# Patient Record
Sex: FEMALE | Race: WHITE | Employment: UNEMPLOYED | URBAN - METROPOLITAN AREA
[De-identification: names, ages, dates, MRNs, and addresses within clinical notes are randomized per-mention and may not be internally consistent; named-entity substitution may affect disease eponyms.]

---

## 2017-02-14 ENCOUNTER — ONCOLOGY VISIT (OUTPATIENT)
Dept: ONCOLOGY | Facility: CLINIC | Age: 62
End: 2017-02-14
Attending: INTERNAL MEDICINE
Payer: COMMERCIAL

## 2017-02-14 VITALS
TEMPERATURE: 98.3 F | DIASTOLIC BLOOD PRESSURE: 85 MMHG | HEART RATE: 102 BPM | WEIGHT: 148.2 LBS | RESPIRATION RATE: 18 BRPM | OXYGEN SATURATION: 97 % | BODY MASS INDEX: 23.92 KG/M2 | SYSTOLIC BLOOD PRESSURE: 143 MMHG

## 2017-02-14 DIAGNOSIS — M89.8X9 BONE PAIN: ICD-10-CM

## 2017-02-14 DIAGNOSIS — D64.9 ANEMIA, UNSPECIFIED TYPE: ICD-10-CM

## 2017-02-14 DIAGNOSIS — R61 NIGHT SWEATS: Primary | ICD-10-CM

## 2017-02-14 DIAGNOSIS — E04.1 THYROID NODULE: ICD-10-CM

## 2017-02-14 DIAGNOSIS — C41.9 EWING'S SARCOMA OF BONE (H): ICD-10-CM

## 2017-02-14 DIAGNOSIS — R79.89 LOW VITAMIN B12 LEVEL: ICD-10-CM

## 2017-02-14 DIAGNOSIS — D75.89 MACROCYTOSIS: ICD-10-CM

## 2017-02-14 DIAGNOSIS — F43.22 ADJUSTMENT DISORDER WITH ANXIOUS MOOD: ICD-10-CM

## 2017-02-14 DIAGNOSIS — R61 NIGHT SWEATS: ICD-10-CM

## 2017-02-14 LAB
ALBUMIN SERPL-MCNC: 3.6 G/DL (ref 3.4–5)
ALBUMIN SERPL-MCNC: 3.8 G/DL (ref 3.4–5)
ALP SERPL-CCNC: 141 U/L (ref 40–150)
ALP SERPL-CCNC: 143 U/L (ref 40–150)
ALT SERPL W P-5'-P-CCNC: 21 U/L (ref 0–50)
ALT SERPL W P-5'-P-CCNC: 23 U/L (ref 0–50)
ANION GAP SERPL CALCULATED.3IONS-SCNC: 10 MMOL/L (ref 3–14)
ANION GAP SERPL CALCULATED.3IONS-SCNC: 7 MMOL/L (ref 3–14)
AST SERPL W P-5'-P-CCNC: 22 U/L (ref 0–45)
AST SERPL W P-5'-P-CCNC: 22 U/L (ref 0–45)
BASOPHILS # BLD AUTO: 0 10E9/L (ref 0–0.2)
BASOPHILS # BLD AUTO: 0 10E9/L (ref 0–0.2)
BASOPHILS NFR BLD AUTO: 0.3 %
BASOPHILS NFR BLD AUTO: 0.4 %
BILIRUB SERPL-MCNC: 0.3 MG/DL (ref 0.2–1.3)
BILIRUB SERPL-MCNC: 0.3 MG/DL (ref 0.2–1.3)
BUN SERPL-MCNC: 16 MG/DL (ref 7–30)
BUN SERPL-MCNC: 17 MG/DL (ref 7–30)
CALCIUM SERPL-MCNC: 9 MG/DL (ref 8.5–10.1)
CALCIUM SERPL-MCNC: 9.4 MG/DL (ref 8.5–10.1)
CHLORIDE SERPL-SCNC: 103 MMOL/L (ref 94–109)
CHLORIDE SERPL-SCNC: 105 MMOL/L (ref 94–109)
CO2 SERPL-SCNC: 26 MMOL/L (ref 20–32)
CO2 SERPL-SCNC: 30 MMOL/L (ref 20–32)
CREAT SERPL-MCNC: 0.77 MG/DL (ref 0.52–1.04)
CREAT SERPL-MCNC: 0.82 MG/DL (ref 0.52–1.04)
DIFFERENTIAL METHOD BLD: ABNORMAL
DIFFERENTIAL METHOD BLD: ABNORMAL
EOSINOPHIL # BLD AUTO: 0.1 10E9/L (ref 0–0.7)
EOSINOPHIL # BLD AUTO: 0.1 10E9/L (ref 0–0.7)
EOSINOPHIL NFR BLD AUTO: 1 %
EOSINOPHIL NFR BLD AUTO: 1.2 %
ERYTHROCYTE [DISTWIDTH] IN BLOOD BY AUTOMATED COUNT: 13.2 % (ref 10–15)
ERYTHROCYTE [DISTWIDTH] IN BLOOD BY AUTOMATED COUNT: 13.4 % (ref 10–15)
ERYTHROCYTE [SEDIMENTATION RATE] IN BLOOD BY WESTERGREN METHOD: 13 MM/H (ref 0–30)
GFR SERPL CREATININE-BSD FRML MDRD: 70 ML/MIN/1.7M2
GFR SERPL CREATININE-BSD FRML MDRD: 76 ML/MIN/1.7M2
GLUCOSE SERPL-MCNC: 102 MG/DL (ref 70–99)
GLUCOSE SERPL-MCNC: 110 MG/DL (ref 70–99)
HCT VFR BLD AUTO: 38.3 % (ref 35–47)
HCT VFR BLD AUTO: 40.8 % (ref 35–47)
HGB BLD-MCNC: 13.1 G/DL (ref 11.7–15.7)
HGB BLD-MCNC: 13.4 G/DL (ref 11.7–15.7)
IMM GRANULOCYTES # BLD: 0 10E9/L (ref 0–0.4)
IMM GRANULOCYTES # BLD: 0 10E9/L (ref 0–0.4)
IMM GRANULOCYTES NFR BLD: 0.3 %
IMM GRANULOCYTES NFR BLD: 0.5 %
LYMPHOCYTES # BLD AUTO: 0.6 10E9/L (ref 0.8–5.3)
LYMPHOCYTES # BLD AUTO: 0.7 10E9/L (ref 0.8–5.3)
LYMPHOCYTES NFR BLD AUTO: 11.5 %
LYMPHOCYTES NFR BLD AUTO: 9 %
MCH RBC QN AUTO: 34.4 PG (ref 26.5–33)
MCH RBC QN AUTO: 34.8 PG (ref 26.5–33)
MCHC RBC AUTO-ENTMCNC: 32.8 G/DL (ref 31.5–36.5)
MCHC RBC AUTO-ENTMCNC: 34.2 G/DL (ref 31.5–36.5)
MCV RBC AUTO: 102 FL (ref 78–100)
MCV RBC AUTO: 105 FL (ref 78–100)
MONOCYTES # BLD AUTO: 0.6 10E9/L (ref 0–1.3)
MONOCYTES # BLD AUTO: 0.6 10E9/L (ref 0–1.3)
MONOCYTES NFR BLD AUTO: 8.1 %
MONOCYTES NFR BLD AUTO: 9.4 %
NEUTROPHILS # BLD AUTO: 4.6 10E9/L (ref 1.6–8.3)
NEUTROPHILS # BLD AUTO: 5.5 10E9/L (ref 1.6–8.3)
NEUTROPHILS NFR BLD AUTO: 77.3 %
NEUTROPHILS NFR BLD AUTO: 81 %
NRBC # BLD AUTO: 0 10*3/UL
NRBC # BLD AUTO: 0 10*3/UL
NRBC BLD AUTO-RTO: 0 /100
NRBC BLD AUTO-RTO: 0 /100
PLATELET # BLD AUTO: 360 10E9/L (ref 150–450)
PLATELET # BLD AUTO: 407 10E9/L (ref 150–450)
POTASSIUM SERPL-SCNC: 4.2 MMOL/L (ref 3.4–5.3)
POTASSIUM SERPL-SCNC: 4.2 MMOL/L (ref 3.4–5.3)
PROT SERPL-MCNC: 6.8 G/DL (ref 6.8–8.8)
PROT SERPL-MCNC: 7 G/DL (ref 6.8–8.8)
RBC # BLD AUTO: 3.76 10E12/L (ref 3.8–5.2)
RBC # BLD AUTO: 3.89 10E12/L (ref 3.8–5.2)
SODIUM SERPL-SCNC: 140 MMOL/L (ref 133–144)
SODIUM SERPL-SCNC: 141 MMOL/L (ref 133–144)
WBC # BLD AUTO: 6 10E9/L (ref 4–11)
WBC # BLD AUTO: 6.8 10E9/L (ref 4–11)

## 2017-02-14 PROCEDURE — 85025 COMPLETE CBC W/AUTO DIFF WBC: CPT | Performed by: INTERNAL MEDICINE

## 2017-02-14 PROCEDURE — 80053 COMPREHEN METABOLIC PANEL: CPT | Performed by: INTERNAL MEDICINE

## 2017-02-14 PROCEDURE — 99212 OFFICE O/P EST SF 10 MIN: CPT

## 2017-02-14 PROCEDURE — 85652 RBC SED RATE AUTOMATED: CPT | Performed by: INTERNAL MEDICINE

## 2017-02-14 PROCEDURE — 36415 COLL VENOUS BLD VENIPUNCTURE: CPT

## 2017-02-14 PROCEDURE — 99214 OFFICE O/P EST MOD 30 MIN: CPT | Mod: 25 | Performed by: INTERNAL MEDICINE

## 2017-02-14 ASSESSMENT — PAIN SCALES - GENERAL: PAINLEVEL: NO PAIN (0)

## 2017-02-14 NOTE — NURSING NOTE
"Lilibeth Pena is a 61 year old female who presents for:  Chief Complaint   Patient presents with     Blood Draw     labs drawn from left arm and vitals taken by [facility]      Oncology Clinic Visit     Return for [place]coma , CT results        Initial Vitals:  /85 (BP Location: Left arm, Patient Position: Chair, Cuff Size: Adult Regular)  Pulse 102  Temp 98.3  F (36.8  C) (Oral)  Resp 18  Wt 67.2 kg (148 lb 3.2 oz)  LMP 05/08/1997  SpO2 97%  BMI 23.92 kg/m2 Estimated body mass index is 23.92 kg/(m^2) as calculated from the following:    Height as of 9/14/15: 1.676 m (5' 6\").    Weight as of this encounter: 67.2 kg (148 lb 3.2 oz).. Body surface area is 1.77 meters squared. BP completed using cuff size: NA (Not Taken)  No Pain (0) Patient's last menstrual period was 05/08/1997. Allergies and medications reviewed.     Medications: Medication refills not needed today.  Pharmacy name entered into [facility]:    [facility] - [address] [facility] [address]    Comments:     5  minutes for nursing intake (face to face time)   Esther Rodriguez MA          "

## 2017-02-14 NOTE — PROGRESS NOTES
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   -  Background  In brief, she noted some right knee pain felt secondary to DJD for a number of years. In about 05/2013, she began noticing a bit of a lump and tenderness a bit farther down the right shin. This was gradually increasing and getting worse over a 1-month time frame. She had some longstanding tingling between the right first two toes. 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  FISH was negative for FLI1/EWSR1 on the BMBX.  -  She started CAV about 10-30-13.  She had 5 cycles of CAV, and 5 cycles of IMV.  Surgery was 5-15-14 w negative margins and a microscopic focus of residual tumor.  She had a nodule removed from the left calf that turned out to be a scar.     Folate and B12 were normal here in 2014.  -  She has had a B12 test that was clearly low at 109 locally and is now on B12 injections.     B12 in 8/15 was 112,  MMA was 0.36 (nl <0.40).  She thinks she was taking B12 before starting in 2013 a bit before starting chemo and wound up stopping during chemo.  She does not have a B12 level here today from the time she started B12.  Therefore its a bit complicated\, but it was low again so she will continue B12 monthly w her PCP.  -     Interval history     She is doing pretty well overall.    She still has her knee pain but it is usually OK with limited use of tramadol and gabapentin; she sees Dr Valdes today on that.    She is working 4 days a week at a local [email or web address].    She is taking zantac daily and occ pepto-bismol, and notices some nausea prior to eating for which she takes zofran, but not often.       She remains on B12 for documented low B12.    She has noted migratory aches and some more night sweats taht she thinks is not related to hot flashes.    She has numbness in the foot post op no different.     Mood is good usually and is  on zoloft, but she is more anxious with her aches and sweats.        She also has a history of kidney stones with a number of hospitalizations. None recently.  She has a history of lumbar low back pain with disk disease and in the past was treated for blood pressure and cholersterol now.       10-point ROS o/w negative.        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   Family history is notable for carcinoid tumor in mother and breast cancer in an aunt and a cousin.   She is  and has one daughter and also a son from her current marriage. She stopped smoking in 1982 but never smoked very much. She drinks about 3 glasses of wine a day and does not abuse other drugs.   -    On exam she appeared comfortable with normal affect.     /85 (BP Location: Left arm, Patient Position: Chair, Cuff Size: Adult Regular)  Pulse 102  Temp 98.3  F (36.8  C) (Oral)  Resp 18  Wt 67.2 kg (148 lb 3.2 oz)  LMP 05/08/1997  SpO2 97%  BMI 23.92 kg/m2  HEENT No icterus, normal oropharynx.  NECK no mass.  CHEST Clear chest.  CV RRR w no sig murmur  ABD No HSMT.  LYMPH No lymphadenopathy.  EXT No edema.  NEURO Heel and toe walking OK, gait normal    Local sites OK on legs.  PSYCH Mood somewhat anxious.     -  No labs yet today.  -  Her original PET-CT did not show metastatic disease.   I reviewed her new CT and I see no new lesions, and that is the formal read.  -     She is now >2.5 years out from the EWS resection (May 2014).     We will follow the MCV for now; she is on B12 now.  The MCV increased toward the end of chemotherapy and has been up since then.    She continues on as needed tramadol.  Zantac will continue     She will follow w Dr Castro on thyroid nodules next week.    The sweats and aches are very non-specific but we will get an ESR today and if abnormal refer to rheum.    We will se her about  6-15 w cxr and labs, and also about 10-12. She following w her PCP to check cholesterol and BP.  We will use cxr mostly now.  -  Aldo Russ M.D.  Professor  Hematology, Oncology and Transplantation  -  cc:   Bon Mansfield MD, Orthopedics   [place]

## 2017-02-14 NOTE — NURSING NOTE
Chief Complaint   Patient presents with     Blood Draw     labs drawn from left arm and vitals taken by JOYA Ma CMA February 14, 2017

## 2017-02-14 NOTE — LETTER
2/14/2017       RE: Lilibeth Pena  [address] [place] [address]     Dear Colleague,    Thank you for referring your patient, Lilibeth Pena, to the [facility]. Please see a copy of my visit note below.    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   -  Background  In brief, she noted some right knee pain felt secondary to DJD for a number of years. In about 05/2013, she began noticing a bit of a lump and tenderness a bit farther down the right shin. This was gradually increasing and getting worse over a 1-month time frame. She had some longstanding tingling between the right first two toes. 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  FISH was negative for FLI1/EWSR1 on the BMBX.  -  She started CAV about 10-30-13.  She had 5 cycles of CAV, and 5 cycles of IMV.  Surgery was 5-15-14 w negative margins and a microscopic focus of residual tumor.  She had a nodule removed from the left calf that turned out to be a scar.     Folate and B12 were normal here in 2014.  -  She has had a B12 test that was clearly low at 109 locally and is now on B12 injections.     B12 in 8/15 was 112,  MMA was 0.36 (nl <0.40).  She thinks she was taking B12 before starting in 2013 a bit before starting chemo and wound up stopping during chemo.  She does not have a B12 level here today from the time she started B12.  Therefore its a bit complicated\, but it was low again so she will continue B12 monthly w her PCP.  -     Interval history     She is doing pretty well overall.    She still has her knee pain but it is usually OK with limited use of tramadol and gabapentin; she sees Dr Valdes today on that.    She is working 4 days a week at a local [employer].    She is taking zantac daily and occ pepto-bismol, and notices some nausea prior to eating for which she takes  zofran, but not often.       She remains on B12 for documented low B12.    She has noted migratory aches and some more night sweats taht she thinks is not related to hot flashes.    She has numbness in the foot post op no different.     Mood is good usually and is on zoloft, but she is more anxious with her aches and sweats.        She also has a history of kidney stones with a number of hospitalizations. None recently.  She has a history of lumbar low back pain with disk disease and in the past was treated for blood pressure and cholersterol now.       10-point ROS o/w negative.        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   Family history is notable for carcinoid tumor in mother and breast cancer in an aunt and a cousin.   She is  and has one daughter and also a son from her current marriage. She stopped smoking in 1982 but never smoked very much. She drinks about 3 glasses of wine a day and does not abuse other drugs.   -    On exam she appeared comfortable with normal affect.     /85 (BP Location: Left arm, Patient Position: Chair, Cuff Size: Adult Regular)  Pulse 102  Temp 98.3  F (36.8  C) (Oral)  Resp 18  Wt 67.2 kg (148 lb 3.2 oz)  LMP 05/08/1997  SpO2 97%  BMI 23.92 kg/m2  HEENT No icterus, normal oropharynx.  NECK no mass.  CHEST Clear chest.  CV RRR w no sig murmur  ABD No HSMT.  LYMPH No lymphadenopathy.  EXT No edema.  NEURO Heel and toe walking OK, gait normal    Local sites OK on legs.  PSYCH Mood somewhat anxious.     -  No labs yet today.  -  Her original PET-CT did not show metastatic disease.   I reviewed her new CT and I see no new lesions, and that is the formal read.  -     She is now >2.5 years out from the EWS resection (May 2014).     We will follow the MCV for now; she is on B12 now.  The MCV increased toward the end of chemotherapy and has been up since  then.    She continues on as needed tramadol.  Zantac will continue     She will follow w Dr Castro on thyroid nodules next week.    The sweats and aches are very non-specific but we will get an ESR today and if abnormal refer to rheum.    We will se her about 6-15 w cxr and labs, and also about 10-12. She following w her PCP to check cholesterol and BP.  We will use cxr mostly now.  -  Aldo Russ M.D.  Professor  Hematology, Oncology and Transplantation  -  cc:   Bon Mansfield MD, Orthopedics   [place]

## 2017-02-14 NOTE — NURSING NOTE
Chief Complaint   Patient presents with     Blood Draw     Labs drawn peripherally from left arm    Ann Suero LPN

## 2017-02-14 NOTE — MR AVS SNAPSHOT
After Visit Summary   2/14/2017    Lilibeth Pena    MRN: [id number]           Patient Information     Date Of Birth          1955        Visit Information        Provider Department      2/14/2017 11:00 AM Aldo Russ MD [facility]        Today's Diagnoses     Night sweats    -  1    Anemia, unspecified type        Adjustment disorder with anxious mood        Crowder's sarcoma of bone (H)        Thyroid nodule        Low vitamin B12 level        Macrocytosis        Bone pain           Follow-ups after your visit        Your next 10 appointments already scheduled     Feb 21, 2017  4:30 PM CST   (Arrive by 4:00 PM)   RETURN ENDOCRINE with Ashely Castro MD   [facility] ([facility])    [address] [place] [address]  3rd [facility] [address] [phone]            Will 15, 2017  1:00 PM CDT   (Arrive by 12:45 PM)   Return Visit with Aldo Russ MD   [facility] ([facility])    [address] [place] [address] [phone]              Future tests that were ordered for you today     Open Future Orders        Priority Expected Expires Ordered    CBC with platelets differential Routine 6/15/2017 2/14/2018 2/14/2017    Comprehensive metabolic panel Routine 6/15/2017 2/14/2018 2/14/2017    XR Chest 2 Views Routine 6/15/2017 2/14/2018 2/14/2017    Erythrocyte sedimentation rate auto Routine  2/14/2018 2/14/2017            Who to contact     If you have questions or need follow up information about today's clinic visit or your schedule please contact [facility] directly at [phone].  Normal or non-critical lab and imaging results will be communicated to you by MyChart, letter or phone within 4 business days after the clinic has received the results. If you do not hear from us within 7 days, please contact the clinic  through Becual or phone. If you have a critical or abnormal lab result, we will notify you by phone as soon as possible.  Submit refill requests through Becual or call your pharmacy and they will forward the refill request to us. Please allow 3 business days for your refill to be completed.          Additional Information About Your Visit        Morningstarhart Information     Becual gives you secure access to your electronic health record. If you see a primary care provider, you can also send messages to your care team and make appointments. If you have questions, please call your primary care clinic.  If you do not have a primary care provider, please call [phone] and they will assist you.        Care EveryWhere ID     This is your Care EveryWhere ID. This could be used by other organizations to access your [place] medical records  [id number]        Your Vitals Were     Pulse Temperature Respirations Last Period Pulse Oximetry BMI (Body Mass Index)    102 98.3  F (36.8  C) (Oral) 18 05/08/1997 97% 23.92 kg/m2       Blood Pressure from Last 3 Encounters:   02/14/17 143/85   10/19/16 129/85   06/15/16 147/90    Weight from Last 3 Encounters:   02/14/17 67.2 kg (148 lb 3.2 oz)   10/19/16 65.4 kg (144 lb 2.9 oz)   06/15/16 66.5 kg (146 lb 8 oz)               Primary Care Provider Office Phone # Fax #    Nayan Batres [phone]       [facility] [address] [place] [address]        Thank you!     Thank you for choosing [facility]  for your care. Our goal is always to provide you with excellent care. Hearing back from our patients is one way we can continue to improve our services. Please take a few minutes to complete the written survey that you may receive in the mail after your visit with us. Thank you!             Your Updated Medication List - Protect others around you: Learn how to safely use, store and throw away your medicines at www.disposemymeds.org.           This list is accurate as of: 2/14/17 11:32 AM.  Always use your most recent med list.                   Brand Name Dispense Instructions for use    gabapentin 300 MG capsule    NEURONTIN    90 capsule    Take 3 tablets (900mg) before bed       HYDROcodone-acetaminophen 5-325 MG per tablet    NORCO    40 tablet    Take 1-2 tablets by mouth every 6 hours as needed for moderate to severe pain       ranitidine 150 MG tablet    ZANTAC    60 tablet    Take 1 tablet (150 mg) by mouth daily       rOPINIRole HCl 2 MG Tb24 tablet    REQUIP     Take 0.5 mg by mouth nightly as needed       SERTRALINE HCL PO      Take 50 mg by mouth daily       traMADol 50 MG tablet    ULTRAM    60 tablet    Take 1-2 tablets ( mg) by mouth every 6 hours as needed for moderate pain       TRAZODONE HCL PO      Take 50 mg by mouth At Bedtime       VITAMIN D (CHOLECALCIFEROL) PO      Take 5,000 Units by mouth daily

## 2017-06-15 ENCOUNTER — ONCOLOGY VISIT (OUTPATIENT)
Dept: ONCOLOGY | Facility: CLINIC | Age: 62
End: 2017-06-15
Attending: INTERNAL MEDICINE
Payer: COMMERCIAL

## 2017-06-15 VITALS
OXYGEN SATURATION: 95 % | RESPIRATION RATE: 18 BRPM | SYSTOLIC BLOOD PRESSURE: 126 MMHG | BODY MASS INDEX: 24.19 KG/M2 | DIASTOLIC BLOOD PRESSURE: 83 MMHG | WEIGHT: 149.9 LBS | HEART RATE: 85 BPM | TEMPERATURE: 98.5 F

## 2017-06-15 DIAGNOSIS — D75.89 MACROCYTOSIS: ICD-10-CM

## 2017-06-15 DIAGNOSIS — C41.9 EWING'S SARCOMA OF BONE (H): ICD-10-CM

## 2017-06-15 DIAGNOSIS — R79.89 LOW VITAMIN B12 LEVEL: ICD-10-CM

## 2017-06-15 DIAGNOSIS — E04.1 THYROID NODULE: ICD-10-CM

## 2017-06-15 DIAGNOSIS — D64.9 ANEMIA, UNSPECIFIED TYPE: ICD-10-CM

## 2017-06-15 DIAGNOSIS — F43.22 ADJUSTMENT DISORDER WITH ANXIOUS MOOD: ICD-10-CM

## 2017-06-15 PROCEDURE — 80053 COMPREHEN METABOLIC PANEL: CPT | Performed by: INTERNAL MEDICINE

## 2017-06-15 PROCEDURE — 99212 OFFICE O/P EST SF 10 MIN: CPT | Mod: ZF

## 2017-06-15 PROCEDURE — 36415 COLL VENOUS BLD VENIPUNCTURE: CPT | Performed by: INTERNAL MEDICINE

## 2017-06-15 PROCEDURE — 99214 OFFICE O/P EST MOD 30 MIN: CPT | Mod: 25 | Performed by: INTERNAL MEDICINE

## 2017-06-15 ASSESSMENT — PAIN SCALES - GENERAL: PAINLEVEL: NO PAIN (0)

## 2017-06-15 NOTE — PROGRESS NOTES
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   -  Background  In brief, she noted some right knee pain felt secondary to DJD for a number of years. In about 05/2013, she began noticing a bit of a lump and tenderness a bit farther down the right shin. This was gradually increasing and getting worse over a 1-month time frame. She had some longstanding tingling between the right first two toes. 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  FISH was negative for FLI1/EWSR1 on the BMBX.  -  She started CAV about 10-30-13.  She had 5 cycles of CAV, and 5 cycles of IMV.  Surgery was 5-15-14 w negative margins and a microscopic focus of residual tumor.  She had a nodule removed from the left calf that turned out to be a scar.      Folate and B12 were normal here in 2014.  -  She has had a B12 test that was clearly low at 109 locally and is now on B12 injections.     B12 in 8/15 was 112,  MMA was 0.36 (nl <0.40).  She thinks she was taking B12 before starting in 2013 a bit before starting chemo and wound up stopping during chemo.  She does not have a B12 level here today from the time she started B12.  Therefore its a bit complicated\, but it was low again so she will continue B12 monthly w her PCP.  -      Interval history      She is doing pretty well overall.    She still has her knee pain but it is usually OK with limited use of tramadol and gabapentin; she saw Dr Valdes who felt it was too early for surgery.    She is working 4 days a week at a local [employer].     She is taking zantac daily w no pepto-bismol, only takes zofran.  She has a long history of gi problems w occ diarrhea depending the food.      She is no longer on B12 but this is being followed by her PCP.    She has numbness in the foot post op no different.    Mood is good usually and is on zoloft 100 mg (which was better than 50).      She  had 2 more kidney stones since her last visit here.    She has a history of lumbar low back pain with disk disease and in the past was treated for blood pressure and cholersterol now.       10-point ROS o/w negative.     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   Family history is notable for carcinoid tumor in mother and breast cancer in an aunt and a cousin.   She is  and has one daughter and also a son from her current marriage. She stopped smoking in 1982 but never smoked very much. She drinks about 3 glasses of wine a day and does not abuse other drugs.   -     On exam she appeared comfortable with normal affect.  /83 (BP Location: Left arm)  Pulse 85  Temp 98.5  F (36.9  C)  Resp 18  Wt 68 kg (149 lb 14.4 oz)  LMP 05/08/1997  SpO2 95%  BMI 24.19 kg/m2  HEENT No icterus, normal oropharynx.  NECK no mass.  CHEST Clear chest.  CV RRR w no sig murmur  ABD No HSMT.  EXT No edema.  NEURO Heel and toe walking OK, gait normal.    LOCAL SITE Local sites OK on legs.  PSYCH Mood somewhat anxious.      -  No labs yet today.  -  Her original PET-CT did not show metastatic disease.   I reviewed her new cxr and I see no new lesions, and that is the formal read.    -      She is now 3 years out from the EWS resection (May 2014).      We will follow the MCV for now; she is on B12 now.  The MCV increased toward the end of chemotherapy and has been up since then.    She continues on as needed tramadol.  Zantac will continue  She will follow w Dr Castro on thyroid nodules.    We will se her about 10-12 w cxr and labs (CBC, CMP re MCV). She following w her PCP to check cholesterol and BP.  We will use cxr mostly now.  -  Aldo Russ M.D.  Professor  Hematology, Oncology and Transplantation  -  cc:   Bon Mansfield MD, Orthopedics

## 2017-06-15 NOTE — MR AVS SNAPSHOT
After Visit Summary   6/15/2017    Lilibeth Pena    MRN: [id number]           Patient Information     Date Of Birth          1955        Visit Information        Provider Department      6/15/2017 1:00 PM Aldo Russ MD [facility]        Today's Diagnoses     Anemia, unspecified type        Adjustment disorder with anxious mood        Crowder's sarcoma of bone (H)        Thyroid nodule        Low vitamin B12 level        Macrocytosis           Follow-ups after your visit        Your next 10 appointments already scheduled     Oct 12, 2017  1:00 PM CDT   (Arrive by 12:45 PM)   Return Visit with Aldo Russ MD   [facility] ([facility])    [address]  [address] [facility] [address] [phone]              Future tests that were ordered for you today     Open Future Orders        Priority Expected Expires Ordered    XR Chest 2 Views Routine 10/12/2017 6/15/2018 6/15/2017            Who to contact     If you have questions or need follow up information about today's clinic visit or your schedule please contact [facility] directly at [phone].  Normal or non-critical lab and imaging results will be communicated to you by MyChart, letter or phone within 4 business days after the clinic has received the results. If you do not hear from us within 7 days, please contact the clinic through ProNervehart or phone. If you have a critical or abnormal lab result, we will notify you by phone as soon as possible.  Submit refill requests through ObserveIT or call your pharmacy and they will forward the refill request to us. Please allow 3 business days for your refill to be completed.          Additional Information About Your Visit        ProNervehart Information     ObserveIT gives you secure access to your electronic health record. If you see a primary care provider, you can also send  messages to your care team and make appointments. If you have questions, please call your primary care clinic.  If you do not have a primary care provider, please call [phone] and they will assist you.        Care EveryWhere ID     This is your Care EveryWhere ID. This could be used by other organizations to access your [place] medical records  [id number]        Your Vitals Were     Pulse Temperature Respirations Last Period Pulse Oximetry BMI (Body Mass Index)    85 98.5  F (36.9  C) 18 05/08/1997 95% 24.19 kg/m2       Blood Pressure from Last 3 Encounters:   06/15/17 126/83   02/14/17 143/85   10/19/16 129/85    Weight from Last 3 Encounters:   06/15/17 68 kg (149 lb 14.4 oz)   02/14/17 67.2 kg (148 lb 3.2 oz)   10/19/16 65.4 kg (144 lb 2.9 oz)              We Performed the Following     CBC with platelets differential     Comprehensive metabolic panel        Primary Care Provider Office Phone # Fax #    Nayan Batres [phone]       [facility] [address] [place] [address]        Thank you!     Thank you for choosing [facility]  for your care. Our goal is always to provide you with excellent care. Hearing back from our patients is one way we can continue to improve our services. Please take a few minutes to complete the written survey that you may receive in the mail after your visit with us. Thank you!             Your Updated Medication List - Protect others around you: Learn how to safely use, store and throw away your medicines at www.disposemymeds.org.          This list is accurate as of: 6/15/17  1:11 PM.  Always use your most recent med list.                   Brand Name Dispense Instructions for use    gabapentin 300 MG capsule    NEURONTIN    90 capsule    Take 3 tablets (900mg) before bed       HYDROcodone-acetaminophen 5-325 MG per tablet    NORCO    40 tablet    Take 1-2 tablets by mouth every 6 hours as needed for moderate to severe pain        ranitidine 150 MG tablet    ZANTAC    60 tablet    Take 1 tablet (150 mg) by mouth daily       rOPINIRole HCl 2 MG Tb24 tablet    REQUIP     Take 0.5 mg by mouth nightly as needed       SERTRALINE HCL PO      Take 50 mg by mouth daily       traMADol 50 MG tablet    ULTRAM    60 tablet    Take 1-2 tablets ( mg) by mouth every 6 hours as needed for moderate pain       TRAZODONE HCL PO      Take 50 mg by mouth At Bedtime       VITAMIN D (CHOLECALCIFEROL) PO      Take 5,000 Units by mouth daily

## 2017-06-15 NOTE — LETTER
6/15/2017       RE: Lilibeth Pena  [address] [facility] [address] [address]     Dear Colleague,    Thank you for referring your patient, Lilibeth Pena, to the [facility]. Please see a copy of my visit note below.    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   -  Background  In brief, she noted some right knee pain felt secondary to DJD for a number of years. In about 05/2013, she began noticing a bit of a lump and tenderness a bit farther down the right shin. This was gradually increasing and getting worse over a 1-month time frame. She had some longstanding tingling between the right first two toes. 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  FISH was negative for FLI1/EWSR1 on the BMBX.  -  She started CAV about 10-30-13.  She had 5 cycles of CAV, and 5 cycles of IMV.  Surgery was 5-15-14 w negative margins and a microscopic focus of residual tumor.  She had a nodule removed from the left calf that turned out to be a scar.      Folate and B12 were normal here in 2014.  -  She has had a B12 test that was clearly low at 109 locally and is now on B12 injections.     B12 in 8/15 was 112,  MMA was 0.36 (nl <0.40).  She thinks she was taking B12 before starting in 2013 a bit before starting chemo and wound up stopping during chemo.  She does not have a B12 level here today from the time she started B12.  Therefore its a bit complicated\, but it was low again so she will continue B12 monthly w her PCP.  -      Interval history      She is doing pretty well overall.    She still has her knee pain but it is usually OK with limited use of tramadol and gabapentin; she saw Dr Valdes who felt it was too early for surgery.    She is working 4 days a week at a local [employer].     She is taking zantac daily w no pepto-bismol, only takes zofran.  She has a long history  of gi problems w occ diarrhea depending the food.      She is no longer on B12 but this is being followed by her PCP.    She has numbness in the foot post op no different.    Mood is good usually and is on zoloft 100 mg (which was better than 50).      She had 2 more kidney stones since her last visit here.    She has a history of lumbar low back pain with disk disease and in the past was treated for blood pressure and cholersterol now.       10-point ROS o/w negative.     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   Family history is notable for carcinoid tumor in mother and breast cancer in an aunt and a cousin.   She is  and has one daughter and also a son from her current marriage. She stopped smoking in 1982 but never smoked very much. She drinks about 3 glasses of wine a day and does not abuse other drugs.   -     On exam she appeared comfortable with normal affect.  /83 (BP Location: Left arm)  Pulse 85  Temp 98.5  F (36.9  C)  Resp 18  Wt 68 kg (149 lb 14.4 oz)  LMP 05/08/1997  SpO2 95%  BMI 24.19 kg/m2  HEENT No icterus, normal oropharynx.  NECK no mass.  CHEST Clear chest.  CV RRR w no sig murmur  ABD No HSMT.  EXT No edema.  NEURO Heel and toe walking OK, gait normal.    LOCAL SITE Local sites OK on legs.  PSYCH Mood somewhat anxious.      -  No labs yet today.  -  Her original PET-CT did not show metastatic disease.   I reviewed her new cxr and I see no new lesions, and that is the formal read.    -      She is now 3 years out from the EWS resection (May 2014).      We will follow the MCV for now; she is on B12 now.  The MCV increased toward the end of chemotherapy and has been up since then.    She continues on as needed tramadol.  Zantac will continue  She will follow w Dr Castro on thyroid nodules.    We will se her about 10-12 w cxr and labs (CBC, CMP re  MCV). She following w her PCP to check cholesterol and BP.  We will use cxr mostly now.  -  Aldo Russ M.D.  Professor  Hematology, Oncology and Transplantation  -  cc:   Bon Mansfield MD, Orthopedics

## 2017-06-15 NOTE — NURSING NOTE
"Oncology Rooming Note    Nely 15, 2017 12:54 PM   Lilibeth Pena is a 62 year old female who presents for:    Chief Complaint   Patient presents with     Oncology Clinic Visit     Crowder's Sarcoma     Initial Vitals: /83 (BP Location: Left arm)  Pulse 85  Temp 98.5  F (36.9  C)  Resp 18  Wt 68 kg (149 lb 14.4 oz)  LMP 05/08/1997  SpO2 95%  BMI 24.19 kg/m2 Estimated body mass index is 24.19 kg/(m^2) as calculated from the following:    Height as of 9/14/15: 1.676 m (5' 6\").    Weight as of this encounter: 68 kg (149 lb 14.4 oz). Body surface area is 1.78 meters squared.  No Pain (0) Comment: Data Unavailable   Patient's last menstrual period was 05/08/1997.  Allergies reviewed: Yes  Medications reviewed: Yes    Medications: Medication refills not needed today.  Pharmacy name entered into [facility]:    [facility] - [address] [facility] [address], [place] - [address]    Clinical concerns: NA     8 minutes for nursing intake (face to face time)     Linda Valencia CMA              "

## 2017-10-17 ENCOUNTER — ONCOLOGY VISIT (OUTPATIENT)
Dept: ONCOLOGY | Facility: CLINIC | Age: 62
End: 2017-10-17
Attending: INTERNAL MEDICINE
Payer: COMMERCIAL

## 2017-10-17 VITALS
SYSTOLIC BLOOD PRESSURE: 147 MMHG | HEART RATE: 77 BPM | WEIGHT: 146.61 LBS | DIASTOLIC BLOOD PRESSURE: 85 MMHG | RESPIRATION RATE: 16 BRPM | TEMPERATURE: 98.2 F | OXYGEN SATURATION: 99 % | BODY MASS INDEX: 23.66 KG/M2

## 2017-10-17 DIAGNOSIS — E04.1 THYROID NODULE: ICD-10-CM

## 2017-10-17 DIAGNOSIS — R79.89 LOW VITAMIN B12 LEVEL: ICD-10-CM

## 2017-10-17 DIAGNOSIS — D75.89 MACROCYTOSIS: ICD-10-CM

## 2017-10-17 DIAGNOSIS — C41.9 EWING'S SARCOMA OF BONE (H): ICD-10-CM

## 2017-10-17 DIAGNOSIS — M25.552 HIP PAIN, LEFT: Primary | ICD-10-CM

## 2017-10-17 DIAGNOSIS — F43.22 ADJUSTMENT DISORDER WITH ANXIOUS MOOD: ICD-10-CM

## 2017-10-17 LAB
BASOPHILS # BLD AUTO: 0 10E9/L (ref 0–0.2)
BASOPHILS NFR BLD AUTO: 0.8 %
DIFFERENTIAL METHOD BLD: ABNORMAL
EOSINOPHIL # BLD AUTO: 0.1 10E9/L (ref 0–0.7)
EOSINOPHIL NFR BLD AUTO: 2.4 %
ERYTHROCYTE [DISTWIDTH] IN BLOOD BY AUTOMATED COUNT: 13.7 % (ref 10–15)
HCT VFR BLD AUTO: 41.3 % (ref 35–47)
HGB BLD-MCNC: 13.2 G/DL (ref 11.7–15.7)
IMM GRANULOCYTES # BLD: 0 10E9/L (ref 0–0.4)
IMM GRANULOCYTES NFR BLD: 0.3 %
LYMPHOCYTES # BLD AUTO: 0.6 10E9/L (ref 0.8–5.3)
LYMPHOCYTES NFR BLD AUTO: 15.8 %
MCH RBC QN AUTO: 33 PG (ref 26.5–33)
MCHC RBC AUTO-ENTMCNC: 32 G/DL (ref 31.5–36.5)
MCV RBC AUTO: 103 FL (ref 78–100)
MONOCYTES # BLD AUTO: 0.4 10E9/L (ref 0–1.3)
MONOCYTES NFR BLD AUTO: 10.6 %
NEUTROPHILS # BLD AUTO: 2.7 10E9/L (ref 1.6–8.3)
NEUTROPHILS NFR BLD AUTO: 70.1 %
NRBC # BLD AUTO: 0 10*3/UL
NRBC BLD AUTO-RTO: 0 /100
PLATELET # BLD AUTO: 302 10E9/L (ref 150–450)
RBC # BLD AUTO: 4 10E12/L (ref 3.8–5.2)
WBC # BLD AUTO: 3.8 10E9/L (ref 4–11)

## 2017-10-17 PROCEDURE — 36415 COLL VENOUS BLD VENIPUNCTURE: CPT | Performed by: INTERNAL MEDICINE

## 2017-10-17 PROCEDURE — 85025 COMPLETE CBC W/AUTO DIFF WBC: CPT | Performed by: INTERNAL MEDICINE

## 2017-10-17 PROCEDURE — 99212 OFFICE O/P EST SF 10 MIN: CPT | Mod: ZF

## 2017-10-17 PROCEDURE — 99214 OFFICE O/P EST MOD 30 MIN: CPT | Mod: GC | Performed by: INTERNAL MEDICINE

## 2017-10-17 ASSESSMENT — PAIN SCALES - GENERAL: PAINLEVEL: NO PAIN (0)

## 2017-10-17 NOTE — NURSING NOTE
"Oncology Rooming Note    October 17, 2017 8:09 AM   Lilibeth Pena is a 62 year old female who presents for:    Chief Complaint   Patient presents with     Oncology Clinic Visit     Return for Ewings Sarcoma, CXR results      Initial Vitals: /85  Pulse 77  Temp 98.2  F (36.8  C) (Oral)  Resp 16  Wt 66.5 kg (146 lb 9.7 oz)  LMP 05/08/1997  SpO2 99%  BMI 23.66 kg/m2 Estimated body mass index is 23.66 kg/(m^2) as calculated from the following:    Height as of 9/14/15: 1.676 m (5' 6\").    Weight as of this encounter: 66.5 kg (146 lb 9.7 oz). Body surface area is 1.76 meters squared.  No Pain (0) Comment: Data Unavailable   Patient's last menstrual period was 05/08/1997.  Allergies reviewed: Yes  Medications reviewed: Yes    Medications: Medication refills not needed today.  Pharmacy name entered into [facility]:    [facility] - [address] [facility]    Clinical concerns: results  Petrona was notified.    7 minutes for nursing intake (face to face time)     Esther Rodriguez MA              "

## 2017-10-17 NOTE — MR AVS SNAPSHOT
After Visit Summary   10/17/2017    Lilibeth Pena    MRN: [id number]           Patient Information     Date Of Birth          1955        Visit Information        Provider Department      10/17/2017 8:30 AM Aldo Russ MD [facility]        Today's Diagnoses     Hip pain, left    -  1    Adjustment disorder with anxious mood        Crowder's sarcoma of bone (H)        Thyroid nodule        Low vitamin B12 level        Macrocytosis           Follow-ups after your visit        Additional Services     ORTHOPEDICS ADULT REFERRAL       Your pro                  Your next 10 appointments already scheduled     Oct 27, 2017  2:30 PM CDT   (Arrive by 2:15 PM)   Return Visit with Bon Mansfield MD   [facility] ([facility])    [address] [place] [address]  4th [facility] [id number] [phone]            Feb 15, 2018 11:00 AM CST   (Arrive by 10:45 AM)   XR CHEST 2 VIEWS with UCXR1   [facility] ([facility])    [address] [place] [id number] [phone]           Please bring a list of your current medicines to your exam. (Include vitamins, minerals and over-thecounter medicines.) Leave your valuables at home.  Tell your doctor if there is a chance you may be pregnant.  You do not need to do anything special for this exam.            Feb 15, 2018 11:15 AM CST   LAB with  LAB   [facility] ([facility])    [address] [place] [id number] [phone]           Patient must bring picture ID. Patient should be prepared to give a urine specimen  Please do not eat 10-12 hours before your appointment if you are coming in fasting for labs on lipids, cholesterol, or glucose (sugar). Pregnant women should follow their Care Team instructions. Water with medications is okay. Do not drink coffee or other  fluids. If you have concerns about taking  your medications, please ask at office or if scheduling via vitaMedMD, send a message by clicking on Secure Messaging, Message Your Care Team.            Feb 15, 2018  1:00 PM CST   (Arrive by 12:45 PM)   Return Visit with Aldo Russ MD   [facility] ([facility])    [address] [facility]  2nd [facility] [id number] [phone]            Jun 14, 2018 10:30 AM CDT   (Arrive by 10:15 AM)   Return Visit with Aldo Russ MD   [facility] ([facility])    [address] [place] [id number] [phone]              Future tests that were ordered for you today     Open Future Orders        Priority Expected Expires Ordered    Vitamin B12 Routine 2/15/2018 10/17/2018 10/17/2017    CBC with platelets differential Routine 2/15/2018 9/17/2018 10/17/2017    Comprehensive metabolic panel Routine 2/15/2018 9/17/2018 10/17/2017    XR Chest 2 Views Routine 2/15/2018 9/17/2018 10/17/2017            Who to contact     If you have questions or need follow up information about today's clinic visit or your schedule please contact [facility] directly at [phone].  Normal or non-critical lab and imaging results will be communicated to you by Futuretechart, letter or phone within 4 business days after the clinic has received the results. If you do not hear from us within 7 days, please contact the clinic through Futuretechart or phone. If you have a critical or abnormal lab result, we will notify you by phone as soon as possible.  Submit refill requests through vitaMedMD or call your pharmacy and they will forward the refill request to us. Please allow 3 business days for your refill to be completed.          Additional Information About Your Visit        Futuretecharticckle Information     vitaMedMD gives you secure access to your electronic health  record. If you see a primary care provider, you can also send messages to your care team and make appointments. If you have questions, please call your primary care clinic.  If you do not have a primary care provider, please call [phone] and they will assist you.        Care EveryWhere ID     This is your Care EveryWhere ID. This could be used by other organizations to access your [facility] medical records  [id number]        Your Vitals Were     Pulse Temperature Respirations Last Period Pulse Oximetry BMI (Body Mass Index)    77 98.2  F (36.8  C) (Oral) 16 05/08/1997 99% 23.66 kg/m2       Blood Pressure from Last 3 Encounters:   10/17/17 147/85   06/15/17 126/83   02/14/17 143/85    Weight from Last 3 Encounters:   10/17/17 66.5 kg (146 lb 9.7 oz)   06/15/17 68 kg (149 lb 14.4 oz)   02/14/17 67.2 kg (148 lb 3.2 oz)              We Performed the Following     CBC with platelets differential     ORTHOPEDICS ADULT REFERRAL        Primary Care Provider Office Phone # Fax #    Nayan Batres [phone]       [facility] [address] [place] [address]        Equal Access to Services     JOSH JACOBO : Hadii elma maloneyo Sodemarcus, waaxda luqadaha, qaybta kaalmada adeegyada, kenroy graham. [facility] [phone].    ATENCIÓN: Si habla español, tiene a shook disposición servicios gratuitos de asistencia lingüística. Llame al [phone].    We comply with applicable federal civil rights laws and [place] laws. We do not discriminate on the basis of race, color, national origin, age, disability, sex, sexual orientation, or gender identity.            Thank you!     Thank you for choosing [facility]  for your care. Our goal is always to provide you with excellent care. Hearing back from our patients is one way we can continue to improve our services. Please take a few minutes to complete the written survey that you may receive in the mail after  your visit with us. Thank you!             Your Updated Medication List - Protect others around you: Learn how to safely use, store and throw away your medicines at www.disposemymeds.org.          This list is accurate as of: 10/17/17  9:25 AM.  Always use your most recent med list.                   Brand Name Dispense Instructions for use Diagnosis    gabapentin 300 MG capsule    NEURONTIN    90 capsule    Take 3 tablets (900mg) before bed    Crowder's sarcoma of bone (H), Peripheral neuropathy       HYDROcodone-acetaminophen 5-325 MG per tablet    NORCO    40 tablet    Take 1-2 tablets by mouth every 6 hours as needed for moderate to severe pain    Leg pain       ranitidine 150 MG tablet    ZANTAC    60 tablet    Take 1 tablet (150 mg) by mouth daily    Esophageal reflux, Sarcoma, Ewings (H)       rOPINIRole HCl 2 MG Tb24 tablet    REQUIP     Take 0.5 mg by mouth nightly as needed        SERTRALINE HCL PO      Take 50 mg by mouth daily        traMADol 50 MG tablet    ULTRAM    60 tablet    Take 1-2 tablets ( mg) by mouth every 6 hours as needed for moderate pain    Crowder's sarcoma of bone (H)       TRAZODONE HCL PO      Take 50 mg by mouth At Bedtime        VITAMIN D (CHOLECALCIFEROL) PO      Take 5,000 Units by mouth daily
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
## 2017-11-03 ENCOUNTER — OFFICE VISIT (OUTPATIENT)
Dept: ORTHOPEDICS | Facility: CLINIC | Age: 62
End: 2017-11-03

## 2017-11-03 VITALS — WEIGHT: 144.8 LBS | BODY MASS INDEX: 23.37 KG/M2

## 2017-11-03 DIAGNOSIS — M76.892 HAMSTRING TENDINITIS OF LEFT THIGH: Primary | ICD-10-CM

## 2017-11-03 DIAGNOSIS — M25.552 HIP PAIN, LEFT: ICD-10-CM
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
## 2017-11-03 NOTE — MR AVS SNAPSHOT
After Visit Summary   11/3/2017    Lilibeth Pena    MRN: [id number]           Patient Information     Date Of Birth          1955        Visit Information        Provider Department      11/3/2017 12:45 PM Bon Mansfield MD [facility]        Today's Diagnoses     Hamstring tendinitis of left thigh    -  1    Hip pain, left           Follow-ups after your visit        Additional Services     PHYSICAL THERAPY REFERRAL (External-Prints)       Physical Therapy Referral                  Your next 10 appointments already scheduled     Nov 13, 2017  1:45 PM CST   (Arrive by 1:30 PM)   MR LOWER EXTREMITY JOINT LEFT W/O & W CONTRAST with [id number]   [facility] ([facility])    [address] [address] [phone]           Take your medicines as usual, unless your doctor tells you not to. Bring a list of your current medicines to your exam (including vitamins, minerals and over-the-counter drugs).  You will be given intravenous contrast for this exam. To prepare:   The day before your exam, drink extra fluids at least six 8-ounce glasses (unless your doctor tells you to restrict your fluids).   Have a blood test (creatinine test) within 30 days of your exam. Go to your clinic or Diagnostic Imaging Department for this test.  The MRI machine uses a strong magnet. Please wear clothes without metal (snaps, zippers). A sweatsuit works well, or we may give you a hospital gown.  Please remove any body piercings and hair extensions before you arrive. You will also remove watches, jewelry, hairpins, wallets, dentures, partial dental plates and hearing aids. You may wear contact lenses, and you may be able to wear your rings. We have a safe place to keep your personal items, but it is safer to leave them at home.   **IMPORTANT** THE INSTRUCTIONS BELOW ARE ONLY FOR THOSE PATIENTS WHO HAVE BEEN TOLD THEY WILL  RECEIVE SEDATION OR GENERAL ANESTHESIA DURING THEIR MRI PROCEDURE:  IF YOU WILL RECEIVE SEDATION (take medicine to help you relax during your exam):   You must get the medicine from your doctor before you arrive. Bring the medicine to the exam. Do not take it at home.   Arrive one hour early. Bring someone who can take you home after the test. Your medicine will make you sleepy. After the exam, you may not drive, take a bus or take a taxi by yourself.   No eating 8 hours before your exam. You may have clear liquids up until 4 hours before your exam. (Clear liquids include water, clear tea, black coffee and fruit juice without pulp.)  IF YOU WILL RECEIVE ANESTHESIA (be asleep for your exam):   Arrive 1 1/2 hours early. Bring someone who can take you home after the test. You may not drive, take a bus or take a taxi by yourself.   No eating 8 hours before your exam. You may have clear liquids up until 4 hours before your exam. (Clear liquids include water, clear tea, black coffee and fruit juice without pulp.)  Please call the Imaging Department at your exam site with any questions.            Feb 15, 2018 11:00 AM CST   (Arrive by 10:45 AM)   XR CHEST 2 VIEWS with UCXR1   [facility] ([facility])    [address] [place] [address] [phone]           Please bring a list of your current medicines to your exam. (Include vitamins, minerals and over-thecounter medicines.) Leave your valuables at home.  Tell your doctor if there is a chance you may be pregnant.  You do not need to do anything special for this exam.            Feb 15, 2018 11:15 AM CST   LAB with  LAB   [facility] ([facility])    [address] [place] [address] [phone]           Please do not eat 10-12 hours before your appointment if you are coming in fasting for labs on lipids, cholesterol, or glucose (sugar). This does  not apply to pregnant women. Water, hot tea and black coffee (with nothing added) are okay. Do not drink other fluids, diet soda or chew gum.            Feb 15, 2018  1:00 PM CST   (Arrive by 12:45 PM)   Return Visit with Aldo Russ MD   [facility] ([facility])    [address] [place] [id number] [phone]            Jun 14, 2018 10:30 AM CDT   (Arrive by 10:15 AM)   Return Visit with Aldo Russ MD   [facility] ([facility])    [address] [address] [phone]              Future tests that were ordered for you today     Open Future Orders        Priority Expected Expires Ordered    MRI Lower extremity joint w & w/o contrast LT Routine  11/3/2018 11/3/2017            Who to contact     Please call your clinic at [phone] to:    Ask questions about your health    Make or cancel appointments    Discuss your medicines    Learn about your test results    Speak to your doctor   If you have compliments or concerns about an experience at your clinic, or if you wish to file a complaint, please contact [facility] Patient Relations at [phone] or email us at [email or web address]         Additional Information About Your Visit        MyChart Information     [employer] gives you secure access to your electronic health record. If you see a primary care provider, you can also send messages to your care team and make appointments. If you have questions, please call your primary care clinic.  If you do not have a primary care provider, please call [phone] and they will assist you.      [employer] is an electronic gateway that provides easy, online access to your medical records. With [employer], you can request a clinic appointment, read your test results, renew a prescription or communicate with your care  team.     To access your existing account, please contact your [facility] or call [phone] for assistance.        Care EveryWhere ID     This is your Care EveryWhere ID. This could be used by other organizations to access your Mobile medical records  [id number]        Your Vitals Were     Last Period BMI (Body Mass Index)                05/08/1997 23.37 kg/m2           Blood Pressure from Last 3 Encounters:   10/17/17 147/85   06/15/17 126/83   02/14/17 143/85    Weight from Last 3 Encounters:   11/03/17 65.7 kg (144 lb 12.8 oz)   10/17/17 66.5 kg (146 lb 9.7 oz)   06/15/17 68 kg (149 lb 14.4 oz)              We Performed the Following     PHYSICAL THERAPY REFERRAL (External-Prints)        Primary Care Provider Fax #    [facility] [phone]       [address] [place] [address]        Equal Access to Services     JOSH JACOBO : Hadjacqueline Meyers, waaxnayeli guerrero, qaybta kaalmada susy, kenroy bhatt . So [facility] [phone].    ATENCIÓN: Si habla español, tiene a shook disposición servicios gratuitos de asistencia lingüística. Llame al [phone].    We comply with applicable federal civil rights laws and [place] laws. We do not discriminate on the basis of race, color, national origin, age, disability, sex, sexual orientation, or gender identity.            Thank you!     Thank you for choosing [facility]  for your care. Our goal is always to provide you with excellent care. Hearing back from our patients is one way we can continue to improve our services. Please take a few minutes to complete the written survey that you may receive in the mail after your visit with us. Thank you!             Your Updated Medication List - Protect others around you: Learn how to safely use, store and throw away your medicines at www.disposemymeds.org.          This list is accurate as of: 11/3/17  2:09 PM.   Always use your most recent med list.                   Brand Name Dispense Instructions for use Diagnosis    gabapentin 300 MG capsule    NEURONTIN    90 capsule    Take 3 tablets (900mg) before bed    Crowder's sarcoma of bone (H), Peripheral neuropathy       HYDROcodone-acetaminophen 5-325 MG per tablet    NORCO    40 tablet    Take 1-2 tablets by mouth every 6 hours as needed for moderate to severe pain    Leg pain       ranitidine 150 MG tablet    ZANTAC    60 tablet    Take 1 tablet (150 mg) by mouth daily    Esophageal reflux, Sarcoma, Ewings (H)       rOPINIRole HCl 2 MG Tb24 tablet    REQUIP     Take 0.5 mg by mouth nightly as needed        SERTRALINE HCL PO      Take 50 mg by mouth daily        traMADol 50 MG tablet    ULTRAM    60 tablet    Take 1-2 tablets ( mg) by mouth every 6 hours as needed for moderate pain    Crowder's sarcoma of bone (H)       TRAZODONE HCL PO      Take 50 mg by mouth At Bedtime        VITAMIN D (CHOLECALCIFEROL) PO      Take 5,000 Units by mouth daily

## 2017-11-03 NOTE — NURSING NOTE
Chief Complaint   Patient presents with     RECHECK     Patient states that she is here today for pain of her Left Outer Lat. Thigh that wraps into her groin and radiates down her Outer Lateral side into her Ankle.        62 year old  1955    Wt 65.7 kg (144 lb 12.8 oz)  LMP 05/08/1997  BMI 23.37 kg/m2                          Pain Assessment  Patient Currently in Pain: Yes  0-10 Pain Scale: 10 (Pt states that she has pain that ranges from 2-10)  Primary Pain Location:  (Groin, Outer lat. entire Left Leg into her ankle. )  Pain Orientation: Left  Pain Descriptors: Constant, Aching, Sharp, Shooting, Radiating  Aggravating Factors:  (Pt states that the pain is always present, but can increase to 10/10 and wake her when she's sleeping. )                            [facility] - [address] [facility] [address]    Allergies   Allergen Reactions     No Clinical Screening - See Comments Rash     OPSITE DRESSING       Tegaderm Transparent Dressing (Informational Only) Rash     Current Outpatient Prescriptions   Medication     traMADol (ULTRAM) 50 MG tablet     HYDROcodone-acetaminophen (NORCO) 5-325 MG per tablet     SERTRALINE HCL PO     TRAZODONE HCL PO     VITAMIN D, CHOLECALCIFEROL, PO     rOPINIRole HCl (REQUIP) 2 MG TB24 tablet     ranitidine (ZANTAC) 150 MG tablet     gabapentin (NEURONTIN) 300 MG capsule     No current facility-administered medications for this visit.      Facility-Administered Medications Ordered in Other Visits   Medication     gadobutrol (GADAVIST) injection 7.5 mL     heparin Lock Flush 10 UNIT/ML 3 mL               Estella Abrams C.M.A.
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
## 2017-12-03 ENCOUNTER — HEALTH MAINTENANCE LETTER (OUTPATIENT)
Age: 62
End: 2017-12-03

## 2017-12-08 ENCOUNTER — OFFICE VISIT (OUTPATIENT)
Dept: ORTHOPEDICS | Facility: CLINIC | Age: 62
End: 2017-12-08

## 2017-12-08 VITALS — BODY MASS INDEX: 23.58 KG/M2 | WEIGHT: 146.1 LBS

## 2017-12-08 DIAGNOSIS — T14.8XXA MUSCLE TEAR: Primary | ICD-10-CM

## 2017-12-08 NOTE — LETTER
12/8/2017       RE: Lilibeth Pena  [address] [place] [id number]     Dear Colleague,    Thank you for referring your patient, Lilibeth Pena, to the [facility] at [facility]. Please see a copy of my visit note below.    DIAGNOSIS:  1. Crowder sarcoma, right fibular diaphysis.   2. Left hamstring tendinitis  3. Left hip abduction weakness possible partial evulsion of the gluteus medius      TREATMENT:  1. Standard neoadjuvant and maintenance chemotherapy plus a wide local excision of the fibular diaphysis and soft tissue mass.   2. Physical therapy    Patient is seen today for follow-up to review her MRI scan.  She reports her symptoms have actually improved since her last visit.    On exam she walks with a normal gait.  She continues to have greater than antigravity left hip abduction strength but the strength is less than normal.    MRI scan was reviewed.  She has tearing and attenuation of a portion of the gluteus medius muscle and gluteus minimus muscle.    Impression: Clinical impression of abductor muscle injury confirmed.    Plan: Physical therapy as previously described.  Oncologic follow-up through Dr Russ.    Sincerely,    Bon Mansfield MD

## 2017-12-08 NOTE — PROGRESS NOTES
DIAGNOSIS:  1. Crowder sarcoma, right fibular diaphysis.   2. Left hamstring tendinitis  3. Left hip abduction weakness possible partial evulsion of the gluteus medius      TREATMENT:  1. Standard neoadjuvant and maintenance chemotherapy plus a wide local excision of the fibular diaphysis and soft tissue mass.   2. Physical therapy    Patient is seen today for follow-up to review her MRI scan.  She reports her symptoms have actually improved since her last visit.    On exam she walks with a normal gait.  She continues to have greater than antigravity left hip abduction strength but the strength is less than normal.    MRI scan was reviewed.  She has tearing and attenuation of a portion of the gluteus medius muscle and gluteus minimus muscle.    Impression: Clinical impression of abductor muscle injury confirmed.    Plan: Physical therapy as previously described.  Oncologic follow-up through Dr Russ.

## 2017-12-08 NOTE — MR AVS SNAPSHOT
After Visit Summary   12/8/2017    Lilibeth Pena    MRN: [id number]           Patient Information     Date Of Birth          1955        Visit Information        Provider Department      12/8/2017 11:30 AM Bon Mansfield MD [facility]        Today's Diagnoses     Muscle tear    -  1       Follow-ups after your visit        Additional Services     PHYSICAL THERAPY REFERRAL (External-Prints)       Physical Therapy Referral                  Your next 10 appointments already scheduled     Feb 15, 2018 11:00 AM CST   (Arrive by 10:45 AM)   XR CHEST 2 VIEWS with UCXR1   [facility] ([facility])    [address] [place] [id number] [phone]           Please bring a list of your current medicines to your exam. (Include vitamins, minerals and over-thecounter medicines.) Leave your valuables at home.  Tell your doctor if there is a chance you may be pregnant.  You do not need to do anything special for this exam.            Feb 15, 2018 11:15 AM CST   LAB with  LAB   [facility] ([facility])    [address] [place] [id number] [phone]           Please do not eat 10-12 hours before your appointment if you are coming in fasting for labs on lipids, cholesterol, or glucose (sugar). This does not apply to pregnant women. Water, hot tea and black coffee (with nothing added) are okay. Do not drink other fluids, diet soda or chew gum.            Feb 15, 2018  1:00 PM CST   (Arrive by 12:45 PM)   Return Visit with Aldo Russ MD   [facility] ([facility])    [address] [place] [id number]   [phone]            Jun 14, 2018 10:30 AM CDT   (Arrive by 10:15 AM)   Return Visit with Aldo Russ MD   [facility] ([facility])    [address] [address] [phone]              Who to contact     Please call your clinic at [phone] to:    Ask questions about your health    Make or cancel appointments    Discuss your medicines    Learn about your test results    Speak to your doctor   If you have compliments or concerns about an experience at your clinic, or if you wish to file a complaint, please contact [facility] Patient Relations at [phone] or email us at [email or web address]         Additional Information About Your Visit        [employer] Information     [employer] gives you secure access to your electronic health record. If you see a primary care provider, you can also send messages to your care team and make appointments. If you have questions, please call your primary care clinic.  If you do not have a primary care provider, please call [phone] and they will assist you.      [employer] is an electronic gateway that provides easy, online access to your medical records. With [employer], you can request a clinic appointment, read your test results, renew a prescription or communicate with your care team.     To access your existing account, please contact your [facility] or call [phone] for assistance.        Care EveryWhere ID     This is your Care EveryWhere ID. This could be used by other organizations to access your [place] medical records  [id number]        Your Vitals Were     Last Period BMI (Body Mass Index)                05/08/1997 23.58 kg/m2           Blood Pressure from Last 3 Encounters:   10/17/17 147/85   06/15/17 126/83   02/14/17 143/85    Weight from Last 3 Encounters:   12/08/17 66.3 kg (146 lb 1.6 oz)   11/03/17 65.7 kg (144 lb 12.8 oz)   10/17/17 66.5 kg (146 lb 9.7 oz)              We Performed the Following     PHYSICAL THERAPY REFERRAL (External-Prints)        Primary Care Provider Fax  #    [facility] -[phone]       [facility] [address]        Equal Access to Services     JOSH JACOBO : Hadii aad ku haddanettemarkell Meyers, watorida rekhafavioha, qatongta kada hanyzairanayeli, kenroy mcintyre jamilajaelyn gatescyrilrandy graham. So [facility] [phone].    ATENCIÓN: Si habla español, tiene a shook disposición servicios gratuitos de asistencia lingüística. Christopherame al [phone].    We comply with applicable federal civil rights laws and [place] laws. We do not discriminate on the basis of race, color, national origin, age, disability, sex, sexual orientation, or gender identity.            Thank you!     Thank you for choosing [facility]  for your care. Our goal is always to provide you with excellent care. Hearing back from our patients is one way we can continue to improve our services. Please take a few minutes to complete the written survey that you may receive in the mail after your visit with us. Thank you!             Your Updated Medication List - Protect others around you: Learn how to safely use, store and throw away your medicines at www.disposemymeds.org.          This list is accurate as of: 12/8/17  1:13 PM.  Always use your most recent med list.                   Brand Name Dispense Instructions for use Diagnosis    gabapentin 300 MG capsule    NEURONTIN    90 capsule    Take 3 tablets (900mg) before bed    Crowder's sarcoma of bone (H), Peripheral neuropathy       HYDROcodone-acetaminophen 5-325 MG per tablet    NORCO    40 tablet    Take 1-2 tablets by mouth every 6 hours as needed for moderate to severe pain    Leg pain       ranitidine 150 MG tablet    ZANTAC    60 tablet    Take 1 tablet (150 mg) by mouth daily    Esophageal reflux, Sarcoma, Ewings (H)       rOPINIRole HCl 2 MG Tb24 tablet    REQUIP     Take 0.5 mg by mouth nightly as needed        SERTRALINE HCL PO      Take 50 mg by mouth daily        traMADol 50 MG tablet    ULTRAM    60 tablet    Take  1-2 tablets ( mg) by mouth every 6 hours as needed for moderate pain    Crowder's sarcoma of bone (H)       TRAZODONE HCL PO      Take 50 mg by mouth At Bedtime        VITAMIN D (CHOLECALCIFEROL) PO      Take 5,000 Units by mouth daily

## 2018-02-15 ENCOUNTER — RADIANT APPOINTMENT (OUTPATIENT)
Dept: GENERAL RADIOLOGY | Facility: CLINIC | Age: 63
End: 2018-02-15
Payer: COMMERCIAL

## 2018-02-15 ENCOUNTER — ONCOLOGY VISIT (OUTPATIENT)
Dept: ONCOLOGY | Facility: CLINIC | Age: 63
End: 2018-02-15
Attending: INTERNAL MEDICINE
Payer: COMMERCIAL

## 2018-02-15 VITALS
RESPIRATION RATE: 16 BRPM | HEIGHT: 66 IN | WEIGHT: 146.5 LBS | TEMPERATURE: 98.5 F | DIASTOLIC BLOOD PRESSURE: 108 MMHG | SYSTOLIC BLOOD PRESSURE: 168 MMHG | OXYGEN SATURATION: 97 % | BODY MASS INDEX: 23.54 KG/M2 | HEART RATE: 94 BPM

## 2018-02-15 DIAGNOSIS — D75.89 MACROCYTOSIS: ICD-10-CM

## 2018-02-15 DIAGNOSIS — M25.552 HIP PAIN, LEFT: ICD-10-CM

## 2018-02-15 DIAGNOSIS — R79.89 LOW VITAMIN B12 LEVEL: ICD-10-CM

## 2018-02-15 DIAGNOSIS — E04.1 THYROID NODULE: ICD-10-CM

## 2018-02-15 DIAGNOSIS — F43.22 ADJUSTMENT DISORDER WITH ANXIOUS MOOD: ICD-10-CM

## 2018-02-15 DIAGNOSIS — C41.9 EWING'S SARCOMA OF BONE (H): ICD-10-CM

## 2018-02-15 DIAGNOSIS — K21.9 GASTROESOPHAGEAL REFLUX DISEASE, ESOPHAGITIS PRESENCE NOT SPECIFIED: Primary | ICD-10-CM

## 2018-02-15 LAB
ALBUMIN SERPL-MCNC: 4.3 G/DL (ref 3.4–5)
ALP SERPL-CCNC: 112 U/L (ref 40–150)
ALT SERPL W P-5'-P-CCNC: 25 U/L (ref 0–50)
ANION GAP SERPL CALCULATED.3IONS-SCNC: 7 MMOL/L (ref 3–14)
AST SERPL W P-5'-P-CCNC: 21 U/L (ref 0–45)
BASOPHILS # BLD AUTO: 0 10E9/L (ref 0–0.2)
BASOPHILS NFR BLD AUTO: 0.5 %
BILIRUB SERPL-MCNC: 0.8 MG/DL (ref 0.2–1.3)
BUN SERPL-MCNC: 13 MG/DL (ref 7–30)
CALCIUM SERPL-MCNC: 9.3 MG/DL (ref 8.5–10.1)
CHLORIDE SERPL-SCNC: 103 MMOL/L (ref 94–109)
CO2 SERPL-SCNC: 27 MMOL/L (ref 20–32)
CREAT SERPL-MCNC: 0.77 MG/DL (ref 0.52–1.04)
DIFFERENTIAL METHOD BLD: ABNORMAL
EOSINOPHIL # BLD AUTO: 0.1 10E9/L (ref 0–0.7)
EOSINOPHIL NFR BLD AUTO: 2.6 %
ERYTHROCYTE [DISTWIDTH] IN BLOOD BY AUTOMATED COUNT: 13.7 % (ref 10–15)
GFR SERPL CREATININE-BSD FRML MDRD: 76 ML/MIN/1.7M2
GLUCOSE SERPL-MCNC: 103 MG/DL (ref 70–99)
HCT VFR BLD AUTO: 42 % (ref 35–47)
HGB BLD-MCNC: 14 G/DL (ref 11.7–15.7)
IMM GRANULOCYTES # BLD: 0 10E9/L (ref 0–0.4)
IMM GRANULOCYTES NFR BLD: 0 %
LYMPHOCYTES # BLD AUTO: 0.9 10E9/L (ref 0.8–5.3)
LYMPHOCYTES NFR BLD AUTO: 24.2 %
MCH RBC QN AUTO: 34.4 PG (ref 26.5–33)
MCHC RBC AUTO-ENTMCNC: 33.3 G/DL (ref 31.5–36.5)
MCV RBC AUTO: 103 FL (ref 78–100)
MONOCYTES # BLD AUTO: 0.5 10E9/L (ref 0–1.3)
MONOCYTES NFR BLD AUTO: 11.6 %
NEUTROPHILS # BLD AUTO: 2.4 10E9/L (ref 1.6–8.3)
NEUTROPHILS NFR BLD AUTO: 61.1 %
NRBC # BLD AUTO: 0 10*3/UL
NRBC BLD AUTO-RTO: 0 /100
PLATELET # BLD AUTO: 292 10E9/L (ref 150–450)
POTASSIUM SERPL-SCNC: 4 MMOL/L (ref 3.4–5.3)
PROT SERPL-MCNC: 7.6 G/DL (ref 6.8–8.8)
RBC # BLD AUTO: 4.07 10E12/L (ref 3.8–5.2)
SODIUM SERPL-SCNC: 137 MMOL/L (ref 133–144)
VIT B12 SERPL-MCNC: 1859 PG/ML (ref 193–986)
WBC # BLD AUTO: 3.9 10E9/L (ref 4–11)

## 2018-02-15 PROCEDURE — 80053 COMPREHEN METABOLIC PANEL: CPT | Performed by: INTERNAL MEDICINE

## 2018-02-15 PROCEDURE — G0463 HOSPITAL OUTPT CLINIC VISIT: HCPCS | Mod: ZF

## 2018-02-15 PROCEDURE — 85025 COMPLETE CBC W/AUTO DIFF WBC: CPT | Performed by: INTERNAL MEDICINE

## 2018-02-15 PROCEDURE — 36415 COLL VENOUS BLD VENIPUNCTURE: CPT | Performed by: INTERNAL MEDICINE

## 2018-02-15 PROCEDURE — 82607 VITAMIN B-12: CPT | Performed by: INTERNAL MEDICINE

## 2018-02-15 PROCEDURE — 99214 OFFICE O/P EST MOD 30 MIN: CPT | Mod: ZP | Performed by: INTERNAL MEDICINE

## 2018-02-15 ASSESSMENT — PAIN SCALES - GENERAL: PAINLEVEL: NO PAIN (0)

## 2018-02-15 NOTE — MR AVS SNAPSHOT
After Visit Summary   2/15/2018    Lilibeth Pena    MRN: [id number]           Patient Information     Date Of Birth          1955        Visit Information        Provider Department      2/15/2018 1:00 PM Aldo Russ MD [facility]        Today's Diagnoses     Gastroesophageal reflux disease, esophagitis presence not specified    -  1    Adjustment disorder with anxious mood        Crowder's sarcoma of bone (H)        Thyroid nodule        Macrocytosis          Care Instructions    Preventive Care:     Colorectal Cancer Screening: During our visit today, we discussed that it is recommended you receive colorectal cancer screening. Please call or make an appointment with your primary care provider to discuss this. You may also call the [facility] scheduling line ([phone]) to set up a colonoscopy appointment.            Follow-ups after your visit        Your next 10 appointments already scheduled     Jun 14, 2018 10:30 AM CDT   (Arrive by 10:15 AM)   Return Visit with Aldo Russ MD   [facility] ([facility])    [address] [place] [address]  [address] [place] [id number] [phone]              Future tests that were ordered for you today     Open Future Orders        Priority Expected Expires Ordered    XR Chest 2 Views Routine 6/14/2018 12/15/2018 2/15/2018    CBC with platelets differential Routine 6/14/2018 12/15/2018 2/15/2018    Comprehensive metabolic panel Routine 6/14/2018 12/15/2018 2/15/2018            Who to contact     If you have questions or need follow up information about today's clinic visit or your schedule please contact [facility] directly at [phone].  Normal or non-critical lab and imaging results will be communicated to you by MyChart, letter or phone within 4 business days after the clinic has received the results. If you do not hear from us within 7  "days, please contact the clinic through [employer] or phone. If you have a critical or abnormal lab result, we will notify you by phone as soon as possible.  Submit refill requests through [employer] or call your pharmacy and they will forward the refill request to us. Please allow 3 business days for your refill to be completed.          Additional Information About Your Visit        [employer] Information     [employer] gives you secure access to your electronic health record. If you see a primary care provider, you can also send messages to your care team and make appointments. If you have questions, please call your primary care clinic.  If you do not have a primary care provider, please call [phone] and they will assist you.        Care EveryWhere ID     This is your Care EveryWhere ID. This could be used by other organizations to access your [place] medical records  [id number]        Your Vitals Were     Pulse Temperature Respirations Height Last Period Pulse Oximetry    94 98.5  F (36.9  C) (Oral) 16 1.676 m (5' 5.98\") 05/08/1997 97%    BMI (Body Mass Index)                   23.66 kg/m2            Blood Pressure from Last 3 Encounters:   02/15/18 (!) 168/108   10/17/17 147/85   06/15/17 126/83    Weight from Last 3 Encounters:   02/15/18 66.5 kg (146 lb 8 oz)   12/08/17 66.3 kg (146 lb 1.6 oz)   11/03/17 65.7 kg (144 lb 12.8 oz)               Primary Care Provider    None Specified       No primary provider on file.        Equal Access to Services     [facility]: Hadii elma gomez hadasho Soomaali, waaxda luqadaha, qaybta kaalmada adeegyada, kenroy idiin hayaan adeeg kharash la'aan . So [facility] [phone].    ATENCIÓN: Si habla español, tiene a shook disposición servicios gratuitos de asistencia lingüística. Llame al [phone].    We comply with applicable federal civil rights laws and [place] laws. We do not discriminate on the basis of race, color, national origin, age, disability, sex, sexual orientation, or " gender identity.            Thank you!     Thank you for choosing [facility]  for your care. Our goal is always to provide you with excellent care. Hearing back from our patients is one way we can continue to improve our services. Please take a few minutes to complete the written survey that you may receive in the mail after your visit with us. Thank you!             Your Updated Medication List - Protect others around you: Learn how to safely use, store and throw away your medicines at www.disposemymeds.org.          This list is accurate as of 2/15/18  1:40 PM.  Always use your most recent med list.                   Brand Name Dispense Instructions for use Diagnosis    gabapentin 300 MG capsule    NEURONTIN    90 capsule    Take 3 tablets (900mg) before bed    Crowder's sarcoma of bone (H), Peripheral neuropathy       HYDROcodone-acetaminophen 5-325 MG per tablet    NORCO    40 tablet    Take 1-2 tablets by mouth every 6 hours as needed for moderate to severe pain    Leg pain       ranitidine 150 MG tablet    ZANTAC    60 tablet    Take 1 tablet (150 mg) by mouth daily    Esophageal reflux, Sarcoma, Ewings (H)       rOPINIRole HCl 2 MG Tb24 tablet    REQUIP     Take 0.5 mg by mouth nightly as needed        SERTRALINE HCL PO      Take 50 mg by mouth daily        traMADol 50 MG tablet    ULTRAM    60 tablet    Take 1-2 tablets ( mg) by mouth every 6 hours as needed for moderate pain    Crowder's sarcoma of bone (H)       TRAZODONE HCL PO      Take 50 mg by mouth At Bedtime        VITAMIN D (CHOLECALCIFEROL) PO      Take 5,000 Units by mouth daily

## 2018-02-15 NOTE — PATIENT INSTRUCTIONS
Preventive Care:     Colorectal Cancer Screening: During our visit today, we discussed that it is recommended you receive colorectal cancer screening. Please call or make an appointment with your primary care provider to discuss this. You may also call the [facility] scheduling line ([phone]) to set up a colonoscopy appointment.

## 2018-02-15 NOTE — LETTER
2/15/2018       RE: Lilibeth Pena  [address] [facility] [place] [id number]     Dear Colleague,    Thank you for referring your patient, Lilibeth Pena, to the [facility]. Please see a copy of my visit note below.    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   -  Background  In brief, she noted some right knee pain felt secondary to DJD for a number of years. In about 05/2013, she began noticing a bit of a lump and tenderness a bit farther down the right shin. This was gradually increasing and getting worse over a 1-month time frame. She had some longstanding tingling between the right first two toes. 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  FISH was negative for FLI1/EWSR1 on the BMBX.  -  She started CAV about 10-30-13.  She had 5 cycles of CAV, and 5 cycles of IMV.  Surgery was 5-15-14 w negative margins and a microscopic focus of residual tumor.  She had a nodule removed from the left calf that turned out to be a scar.      Folate and B12 were normal here in 2014.  -  She has had a B12 test that was clearly low at 109 locally and was previously on B12 injections.     B12 in 8/15 was 112,  MMA was 0.36 (nl <0.40).  She thinks she was taking B12 before starting in 2013 a bit before starting chemo and wound up stopping during chemo.  Therefore its a bit complicated and she follows w her PCP.  -      Interval history    She is doing pretty well overall.     She continues to hae some stomach issues with intermittent pain; She has GERD and had a dilation 15+ years ago and also needs a colonoscopy.  A couple time she woke up and vomited.  We discussed blocks under the head of the bed and seeing her GI again about endoscopy and colonoscopy soon.  She is taking zantac daily w no pepto-bismol, and finds zofran helpful.  She has a long history of gi  "problems w occ diarrhea depending the food.    She did some PT for her L hip and this is much better.    She still has persistent night sweats.      She is no longer on im B12 but is taking PO B12..     Mood is good usually and is on 50.      She has a history of lumbar low back pain with disk disease and in the past was treated for blood pressure and cholersterol now.  She is not taking a  BP med now.  She will check w her PCP on BP control.      10-point ROS o/w negative.      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   Family history is notable for carcinoid tumor in mother and breast cancer in an aunt and a cousin.   She is  and has one daughter and also a son from her current marriage. She stopped smoking in 1982 but never smoked very much. She drinks about 3 glasses of wine a day and does not abuse other drugs.   -      On exam she appeared comfortable with normal affect.  BP (!) 168/108 (BP Location: Right arm, Patient Position: Chair, Cuff Size: Adult Regular)  Pulse 94  Temp 98.5  F (36.9  C) (Oral)  Resp 16  Ht 1.676 m (5' 5.98\")  Wt 66.5 kg (146 lb 8 oz)  LMP 05/08/1997  SpO2 97%  BMI 23.66 kg/m2  GENERAL Appears well. Accompanied by .  HEENT No icterus.  NECK no mass.  CHEST Clear chest.   CV RRR w no sig murmur  LYMPH No lymphadenopathy.  ABD No HSMT. Soft, non-tender, non-distended.   EXT No edema.  NEURO Normal Romberg, heel/toe.  LOCAL SITE Local sites OK on legs.  PSYCH Mood pleasant   SKIN OK      -  GNE, LFT, CBC OK except ; B12 1859.      -  Her original PET-CT did not show metastatic disease.   I reviewed her new cxr and I see no new lesions, but it has not yet been formally read.      -  She will be 4 years out next time from the EWS resection (May 2014).      We will follow the MCV for now; the last B12 was before October 2016.  The MCV " increased toward the end of chemotherapy and has been up since then. Encouraged to follow-up with PCP.     She continues on as needed tramadol.  Zantac will continue    She will check w her PCP on BP control.  She will see her GI again about endoscopy and colonoscopy soon.   She will follow w PCP on thyroid nodules.    If the formal cxr read is OK we will see her about 6-14 w cxr and labs (CBC, CMP re MCV). She is following w her PCP to check cholesterol and BP.       Aldo Russ M.D.  Professor  Hematology, Oncology and Transplantation  -  cc:   Bon Mansfield MD, Orthopedics

## 2018-02-15 NOTE — PROGRESS NOTES
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   -  Background  In brief, she noted some right knee pain felt secondary to DJD for a number of years. In about 05/2013, she began noticing a bit of a lump and tenderness a bit farther down the right shin. This was gradually increasing and getting worse over a 1-month time frame. She had some longstanding tingling between the right first two toes. 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  FISH was negative for FLI1/EWSR1 on the BMBX.  -  She started CAV about 10-30-13.  She had 5 cycles of CAV, and 5 cycles of IMV.  Surgery was 5-15-14 w negative margins and a microscopic focus of residual tumor.  She had a nodule removed from the left calf that turned out to be a scar.      Folate and B12 were normal here in 2014.  -  She has had a B12 test that was clearly low at 109 locally and was previously on B12 injections.     B12 in 8/15 was 112,  MMA was 0.36 (nl <0.40).  She thinks she was taking B12 before starting in 2013 a bit before starting chemo and wound up stopping during chemo.  Therefore its a bit complicated and she follows w her PCP.  -      Interval history    She is doing pretty well overall.     She continues to hae some stomach issues with intermittent pain; She has GERD and had a dilation 15+ years ago and also needs a colonoscopy.  A couple time she woke up and vomited.  We discussed blocks under the head of the bed and seeing her GI again about endoscopy and colonoscopy soon.  She is taking zantac daily w no pepto-bismol, and finds zofran helpful.  She has a long history of gi problems w occ diarrhea depending the food.    She did some PT for her L hip and this is much better.    She still has persistent night sweats.      She is no longer on im B12 but is taking PO B12..     Mood is good usually and is on 50.      She has a history of  "lumbar low back pain with disk disease and in the past was treated for blood pressure and cholersterol now.  She is not taking a  BP med now.  She will check w her PCP on BP control.      10-point ROS o/w negative.      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   Family history is notable for carcinoid tumor in mother and breast cancer in an aunt and a cousin.   She is  and has one daughter and also a son from her current marriage. She stopped smoking in 1982 but never smoked very much. She drinks about 3 glasses of wine a day and does not abuse other drugs.   -      On exam she appeared comfortable with normal affect.  BP (!) 168/108 (BP Location: Right arm, Patient Position: Chair, Cuff Size: Adult Regular)  Pulse 94  Temp 98.5  F (36.9  C) (Oral)  Resp 16  Ht 1.676 m (5' 5.98\")  Wt 66.5 kg (146 lb 8 oz)  LMP 05/08/1997  SpO2 97%  BMI 23.66 kg/m2  GENERAL Appears well. Accompanied by .  HEENT No icterus.  NECK no mass.  CHEST Clear chest.   CV RRR w no sig murmur  LYMPH No lymphadenopathy.  ABD No HSMT. Soft, non-tender, non-distended.   EXT No edema.  NEURO Normal Romberg, heel/toe.  LOCAL SITE Local sites OK on legs.  PSYCH Mood pleasant   SKIN OK      -  GNE, LFT, CBC OK except ; B12 1859.      -  Her original PET-CT did not show metastatic disease.   I reviewed her new cxr and I see no new lesions, but it has not yet been formally read.      -  She will be 4 years out next time from the EWS resection (May 2014).      We will follow the MCV for now; the last B12 was before October 2016.  The MCV increased toward the end of chemotherapy and has been up since then. Encouraged to follow-up with PCP.     She continues on as needed tramadol.  Zantac will continue    She will check w her PCP on BP control.  She will see her GI again about endoscopy and colonoscopy " soon.   She will follow w PCP on thyroid nodules.    If the formal cxr read is OK we will see her about 6-14 w cxr and labs (CBC, CMP re MCV). She is following w her PCP to check cholesterol and BP.       Aldo Russ M.D.  Professor  Hematology, Oncology and Transplantation  -  cc:   Bon Mansfield MD, Orthopedics

## 2018-02-15 NOTE — NURSING NOTE
"Oncology Rooming Note    February 15, 2018 1:02 PM   Lilibeth Pena is a 62 year old female who presents for:    Chief Complaint   Patient presents with     Oncology Clinic Visit     Return: Crowder's Scaroma      Initial Vitals: BP (!) 168/108 (BP Location: Right arm, Patient Position: Chair, Cuff Size: Adult Regular)  Pulse 94  Temp 98.5  F (36.9  C) (Oral)  Resp 16  Ht 1.676 m (5' 5.98\")  Wt 66.5 kg (146 lb 8 oz)  LMP 05/08/1997  SpO2 97%  BMI 23.66 kg/m2 Estimated body mass index is 23.66 kg/(m^2) as calculated from the following:    Height as of this encounter: 1.676 m (5' 5.98\").    Weight as of this encounter: 66.5 kg (146 lb 8 oz). Body surface area is 1.76 meters squared.  No Pain (0) Comment: Data Unavailable   Patient's last menstrual period was 05/08/1997.  Allergies reviewed: YES  Medications reviewed: YES    Medications: Medication refills not needed today.  Pharmacy name entered into [employer]:    [facility] [address] [facility] [address] [place] [address]    Clinical concerns: no new concerns.  Pt received flu shot elsewhere. See Immunizations   Updated pt health maintenance on Mammogram . Pt has had it done in 2016.   pt did not have colonoscopy done yet, contact information has been added to AVS.         6 minutes for nursing intake (face to face time)     Margaret Vinson CMA                "

## 2018-06-13 ENCOUNTER — RADIANT APPOINTMENT (OUTPATIENT)
Dept: GENERAL RADIOLOGY | Facility: CLINIC | Age: 63
End: 2018-06-13
Attending: INTERNAL MEDICINE
Payer: COMMERCIAL

## 2018-06-13 DIAGNOSIS — C41.9 EWING'S SARCOMA OF BONE (H): ICD-10-CM

## 2018-06-13 DIAGNOSIS — F43.22 ADJUSTMENT DISORDER WITH ANXIOUS MOOD: ICD-10-CM

## 2018-06-13 DIAGNOSIS — K21.9 GASTROESOPHAGEAL REFLUX DISEASE, ESOPHAGITIS PRESENCE NOT SPECIFIED: ICD-10-CM

## 2018-06-13 DIAGNOSIS — D75.89 MACROCYTOSIS: ICD-10-CM

## 2018-06-13 DIAGNOSIS — E04.1 THYROID NODULE: ICD-10-CM

## 2018-06-13 NOTE — PROGRESS NOTES
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   -  Background  In brief, she noted some right knee pain felt secondary to DJD for a number of years. In about 05/2013, she began noticing a bit of a lump and tenderness a bit farther down the right shin. This was gradually increasing and getting worse over a 1-month time frame. She had some longstanding tingling between the right first two toes. 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  FISH was negative for FLI1/EWSR1 on the BMBX.  -  She started CAV about 10-30-13.  She had 5 cycles of CAV, and 5 cycles of IMV.  Surgery was 5-15-14 w negative margins and a microscopic focus of residual tumor.  She had a nodule removed from the left calf that turned out to be a scar.      Folate and B12 were normal here in 2014.  -  She has had a B12 test that was clearly low at 109 locally and was previously on B12 injections.     B12 in 8/15 was 112,  MMA was 0.36 (nl <0.40).  She thinks she was taking B12 before starting in 2013 a bit before starting chemo and wound up stopping during chemo.  Therefore its a bit complicated and she follows w her PCP.  -      Interval history     She is doing pretty well overall.      She is following her BP at home now and it may be creeping back up; she is following w her PCP on that. She is not taking a  BP med now.  She will check w her PCP on BP control.    She continues to hae some stomach issues with intermittent pain; She has GERD and had a dilation 15+ years ago and also needs a colonoscopy.    She is taking zantac daily, and finds zofran helpful.  She has a long history of gi problems w occ diarrhea depending the food.  We discussed again blocks under the head of the bed and seeing her GI again about endoscopy and colonoscopy soon; she is also getting a mamogram soon.    She still has persistent night sweats.     She is  "no longer on B12.      Mood is good usually and is on 50 sertraline.      She has a history of lumbar low back pain with disk disease and in the past was treated for blood pressure and cholersterol now.        10-point ROS o/w negative.      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   Family history is notable for carcinoid tumor in mother and breast cancer in an aunt and a cousin.   She is  and has one daughter and also a son from her current marriage. She stopped smoking in 1982 but never smoked very much. She drinks about 3 glasses of wine a day and does not abuse other drugs.   -      On exam she appeared comfortable with normal affect.    GENERAL Appears well. Accompanied by .  BP (!) 164/98  Pulse 87  Temp 98  F (36.7  C)  Resp 18  Ht 1.676 m (5' 5.98\")  Wt 65.8 kg (145 lb)  LMP 05/08/1997  SpO2 98%  BMI 23.41 kg/m2  HEENT No icterus.  NECK no mass.  CHEST Clear chest.   LYMPH No lymphadenopathy.  CV RRR w no sig murmur  ABD No HSMT.   EXT No edema.  NEURO Normal Romberg, heel/toe.  LOCAL SITE Local sites OK on legs.  PSYCH Mood pleasant   SKIN OK      -  CBC OK except MCV still 103; B12 was 1859 in the past.  She is not on B12 now.    -  Her original PET-CT did not show metastatic disease.   I reviewed her new cxr and I see no new lesions, and that is the formal read.      -  She is 4 years out from the EWS resection (May 2014).    We will follow the MCV for now; the last B12 was before October 2016.  The MCV increased toward the end of chemotherapy and has been up since then. Encouraged to follow-up with PCP.     She continues on as needed tramadol.  Zantac will continue    She will check w her PCP on BP control.  She will see her GI again about endoscopy and colonoscopy soon.   She will follow w PCP on thyroid nodules.    We will see her about 10-18 w cxr and labs " (CBC, CMP re MCV). She is following w her PCP to check cholesterol and BP.       Aldo Russ M.D.  Professor  Hematology, Oncology and Transplantation  -  cc:   Bon Mansfield MD, Orthopedics

## 2018-06-14 ENCOUNTER — ONCOLOGY VISIT (OUTPATIENT)
Dept: ONCOLOGY | Facility: CLINIC | Age: 63
End: 2018-06-14
Attending: INTERNAL MEDICINE
Payer: COMMERCIAL

## 2018-06-14 ENCOUNTER — APPOINTMENT (OUTPATIENT)
Dept: LAB | Facility: CLINIC | Age: 63
End: 2018-06-14
Attending: INTERNAL MEDICINE
Payer: COMMERCIAL

## 2018-06-14 VITALS
TEMPERATURE: 98 F | SYSTOLIC BLOOD PRESSURE: 164 MMHG | WEIGHT: 145 LBS | OXYGEN SATURATION: 98 % | HEART RATE: 87 BPM | DIASTOLIC BLOOD PRESSURE: 98 MMHG | RESPIRATION RATE: 18 BRPM | HEIGHT: 66 IN | BODY MASS INDEX: 23.3 KG/M2

## 2018-06-14 DIAGNOSIS — C41.9 EWING'S SARCOMA OF BONE (H): ICD-10-CM

## 2018-06-14 DIAGNOSIS — F43.22 ADJUSTMENT DISORDER WITH ANXIOUS MOOD: ICD-10-CM

## 2018-06-14 DIAGNOSIS — E04.1 THYROID NODULE: ICD-10-CM

## 2018-06-14 DIAGNOSIS — K21.9 GASTROESOPHAGEAL REFLUX DISEASE, ESOPHAGITIS PRESENCE NOT SPECIFIED: ICD-10-CM

## 2018-06-14 DIAGNOSIS — D75.89 MACROCYTOSIS: ICD-10-CM

## 2018-06-14 LAB
ALBUMIN SERPL-MCNC: 4 G/DL (ref 3.4–5)
ALP SERPL-CCNC: 117 U/L (ref 40–150)
ALT SERPL W P-5'-P-CCNC: 24 U/L (ref 0–50)
ANION GAP SERPL CALCULATED.3IONS-SCNC: 9 MMOL/L (ref 3–14)
AST SERPL W P-5'-P-CCNC: 26 U/L (ref 0–45)
BASOPHILS # BLD AUTO: 0 10E9/L (ref 0–0.2)
BASOPHILS NFR BLD AUTO: 0.8 %
BILIRUB SERPL-MCNC: 0.6 MG/DL (ref 0.2–1.3)
BUN SERPL-MCNC: 18 MG/DL (ref 7–30)
CALCIUM SERPL-MCNC: 9.1 MG/DL (ref 8.5–10.1)
CHLORIDE SERPL-SCNC: 104 MMOL/L (ref 94–109)
CO2 SERPL-SCNC: 24 MMOL/L (ref 20–32)
CREAT SERPL-MCNC: 0.74 MG/DL (ref 0.52–1.04)
DIFFERENTIAL METHOD BLD: ABNORMAL
EOSINOPHIL # BLD AUTO: 0.1 10E9/L (ref 0–0.7)
EOSINOPHIL NFR BLD AUTO: 2.1 %
ERYTHROCYTE [DISTWIDTH] IN BLOOD BY AUTOMATED COUNT: 13.8 % (ref 10–15)
GFR SERPL CREATININE-BSD FRML MDRD: 80 ML/MIN/1.7M2
GLUCOSE SERPL-MCNC: 93 MG/DL (ref 70–99)
HCT VFR BLD AUTO: 40.7 % (ref 35–47)
HGB BLD-MCNC: 13.9 G/DL (ref 11.7–15.7)
IMM GRANULOCYTES # BLD: 0 10E9/L (ref 0–0.4)
IMM GRANULOCYTES NFR BLD: 0.8 %
LYMPHOCYTES # BLD AUTO: 0.8 10E9/L (ref 0.8–5.3)
LYMPHOCYTES NFR BLD AUTO: 21.1 %
MCH RBC QN AUTO: 35.3 PG (ref 26.5–33)
MCHC RBC AUTO-ENTMCNC: 34.2 G/DL (ref 31.5–36.5)
MCV RBC AUTO: 103 FL (ref 78–100)
MONOCYTES # BLD AUTO: 0.4 10E9/L (ref 0–1.3)
MONOCYTES NFR BLD AUTO: 10.8 %
NEUTROPHILS # BLD AUTO: 2.5 10E9/L (ref 1.6–8.3)
NEUTROPHILS NFR BLD AUTO: 64.4 %
NRBC # BLD AUTO: 0 10*3/UL
NRBC BLD AUTO-RTO: 0 /100
PLATELET # BLD AUTO: 326 10E9/L (ref 150–450)
POTASSIUM SERPL-SCNC: 4.1 MMOL/L (ref 3.4–5.3)
PROT SERPL-MCNC: 7.6 G/DL (ref 6.8–8.8)
RBC # BLD AUTO: 3.94 10E12/L (ref 3.8–5.2)
SODIUM SERPL-SCNC: 137 MMOL/L (ref 133–144)
WBC # BLD AUTO: 3.9 10E9/L (ref 4–11)

## 2018-06-14 PROCEDURE — 99214 OFFICE O/P EST MOD 30 MIN: CPT | Mod: ZP | Performed by: INTERNAL MEDICINE

## 2018-06-14 PROCEDURE — 36415 COLL VENOUS BLD VENIPUNCTURE: CPT

## 2018-06-14 PROCEDURE — G0463 HOSPITAL OUTPT CLINIC VISIT: HCPCS | Mod: ZF

## 2018-06-14 PROCEDURE — 80053 COMPREHEN METABOLIC PANEL: CPT | Performed by: INTERNAL MEDICINE

## 2018-06-14 PROCEDURE — 85025 COMPLETE CBC W/AUTO DIFF WBC: CPT | Performed by: INTERNAL MEDICINE

## 2018-06-14 ASSESSMENT — PAIN SCALES - GENERAL: PAINLEVEL: NO PAIN (0)

## 2018-06-14 NOTE — NURSING NOTE
done by RN, pt tolerated well, labs collected and sent, VS taken and pt checked in for next appt.   Tamiko Carvajal

## 2018-06-14 NOTE — MR AVS SNAPSHOT
After Visit Summary   6/14/2018    Lilibeth Pena    MRN: [id number]           Patient Information     Date Of Birth          1955        Visit Information        Provider Department      6/14/2018 10:30 AM Aldo Russ MD [facility]        Today's Diagnoses     Adjustment disorder with anxious mood        Crowder's sarcoma of bone (H)        Thyroid nodule        Macrocytosis        Gastroesophageal reflux disease, esophagitis presence not specified           Follow-ups after your visit        Your next 10 appointments already scheduled     Oct 18, 2018 11:00 AM CDT   (Arrive by 10:45 AM)   Return Visit with Aldo Russ MD   [facility] ([facility])    [address]  [address]  [facility] [place] [facility] [address] [phone]              Future tests that were ordered for you today     Open Future Orders        Priority Expected Expires Ordered    MA Screening Digital Bilateral Routine 6/14/2018 6/14/2019 6/14/2018    XR Chest 2 Views Routine 10/17/2018 6/14/2019 6/14/2018            Who to contact     If you have questions or need follow up information about today's clinic visit or your schedule please contact [facility] directly at [phone].  Normal or non-critical lab and imaging results will be communicated to you by MyChart, letter or phone within 4 business days after the clinic has received the results. If you do not hear from us within 7 days, please contact the clinic through [employer] or phone. If you have a critical or abnormal lab result, we will notify you by phone as soon as possible.  Submit refill requests through [employer] or call your pharmacy and they will forward the refill request to us. Please allow 3 business days for your refill to be completed.          Additional Information About Your Visit        [employer] Information     [employer] gives you secure access to your  "electronic health record. If you see a primary care provider, you can also send messages to your care team and make appointments. If you have questions, please call your primary care clinic.  If you do not have a primary care provider, please call [phone] and they will assist you.        Care EveryWhere ID     This is your Care EveryWhere ID. This could be used by other organizations to access your [place] medical records  [id number]        Your Vitals Were     Pulse Temperature Respirations Height Last Period Pulse Oximetry    87 98  F (36.7  C) 18 1.676 m (5' 5.98\") 05/08/1997 98%    BMI (Body Mass Index)                   23.41 kg/m2            Blood Pressure from Last 3 Encounters:   06/14/18 (!) 164/98   02/15/18 (!) 168/108   10/17/17 147/85    Weight from Last 3 Encounters:   06/14/18 65.8 kg (145 lb)   02/15/18 66.5 kg (146 lb 8 oz)   12/08/17 66.3 kg (146 lb 1.6 oz)              We Performed the Following     CBC with platelets differential     CBC with platelets differential     Comprehensive metabolic panel     Comprehensive metabolic panel        Primary Care Provider Fax #    Physician No Ref-Primary [phone]       No address on file        Equal Access to Services     JOSH JACOBO : Hadii elma maloneyo Sodemarcus, waaxda luqadaha, qaybta kaalmada adeegyada, kenroy bhatt . So [facility] [phone].    ATENCIÓN: Si habla español, tiene a shook disposición servicios gratuitos de asistencia lingüística. Llame al [phone].    We comply with applicable federal civil rights laws and [place] laws. We do not discriminate on the basis of race, color, national origin, age, disability, sex, sexual orientation, or gender identity.            Thank you!     Thank you for choosing [facility]  for your care. Our goal is always to provide you with excellent care. Hearing back from our patients is one way we can continue to improve our services. Please take a few " minutes to complete the written survey that you may receive in the mail after your visit with us. Thank you!             Your Updated Medication List - Protect others around you: Learn how to safely use, store and throw away your medicines at www.disposemymeds.org.          This list is accurate as of 6/14/18 10:57 AM.  Always use your most recent med list.                   Brand Name Dispense Instructions for use Diagnosis    gabapentin 300 MG capsule    NEURONTIN    90 capsule    Take 3 tablets (900mg) before bed    Crowder's sarcoma of bone (H), Peripheral neuropathy       HYDROcodone-acetaminophen 5-325 MG per tablet    NORCO    40 tablet    Take 1-2 tablets by mouth every 6 hours as needed for moderate to severe pain    Leg pain       ranitidine 150 MG tablet    ZANTAC    60 tablet    Take 1 tablet (150 mg) by mouth daily    Esophageal reflux, Sarcoma, Ewings (H)       rOPINIRole HCl 2 MG Tb24 tablet    REQUIP     Take 0.5 mg by mouth nightly as needed        SERTRALINE HCL PO      Take 50 mg by mouth daily        traMADol 50 MG tablet    ULTRAM    60 tablet    Take 1-2 tablets ( mg) by mouth every 6 hours as needed for moderate pain    Crowder's sarcoma of bone (H)       TRAZODONE HCL PO      Take 50 mg by mouth At Bedtime        VITAMIN D (CHOLECALCIFEROL) PO      Take 5,000 Units by mouth daily

## 2018-06-14 NOTE — NURSING NOTE
"Oncology Rooming Note    June 14, 2018 10:43 AM   Lilibeth Pena is a 63 year old female who presents for:    Chief Complaint   Patient presents with     Blood Draw     Oncology Clinic Visit     F/U Ewings Sarcoma     Initial Vitals: BP (!) 164/98  Pulse 87  Temp 98  F (36.7  C)  Resp 18  Ht 1.676 m (5' 5.98\")  Wt 65.8 kg (145 lb)  LMP 05/08/1997  SpO2 98%  BMI 23.41 kg/m2 Estimated body mass index is 23.41 kg/(m^2) as calculated from the following:    Height as of this encounter: 1.676 m (5' 5.98\").    Weight as of this encounter: 65.8 kg (145 lb). Body surface area is 1.75 meters squared.  No Pain (0) Comment: Data Unavailable   Patient's last menstrual period was 05/08/1997.  Allergies reviewed: Yes  Medications reviewed: Yes    Medications: Medication refills not needed today.  Pharmacy name entered into [facility]:    [facility] - [address] [facility] [address]    Clinical concerns: none Dr Russ was NOT notified.    10 minutes for nursing intake (face to face time)     SOO VEGAS LPN            "

## 2018-06-14 NOTE — LETTER
6/14/2018       RE: Lilibeth Pena  [address] [facility] [address]     Dear Colleague,    Thank you for referring your patient, Lilibeth Pena, to the [facility]. Please see a copy of my visit note below.    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   -  Background  In brief, she noted some right knee pain felt secondary to DJD for a number of years. In about 05/2013, she began noticing a bit of a lump and tenderness a bit farther down the right shin. This was gradually increasing and getting worse over a 1-month time frame. She had some longstanding tingling between the right first two toes. 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  FISH was negative for FLI1/EWSR1 on the BMBX.  -  She started CAV about 10-30-13.  She had 5 cycles of CAV, and 5 cycles of IMV.  Surgery was 5-15-14 w negative margins and a microscopic focus of residual tumor.  She had a nodule removed from the left calf that turned out to be a scar.      Folate and B12 were normal here in 2014.  -  She has had a B12 test that was clearly low at 109 locally and was previously on B12 injections.     B12 in 8/15 was 112,  MMA was 0.36 (nl <0.40).  She thinks she was taking B12 before starting in 2013 a bit before starting chemo and wound up stopping during chemo.  Therefore its a bit complicated and she follows w her PCP.  -      Interval history     She is doing pretty well overall.      She is following her BP at home now and it may be creeping back up; she is following w her PCP on that. She is not taking a  BP med now.  She will check w her PCP on BP control.    She continues to hae some stomach issues with intermittent pain; She has GERD and had a dilation 15+ years ago and also needs a colonoscopy.    She is taking zantac daily, and finds zofran helpful.  She has a long  "history of gi problems w occ diarrhea depending the food.  We discussed again blocks under the head of the bed and seeing her GI again about endoscopy and colonoscopy soon; she is also getting a mamogram soon.    She still has persistent night sweats.     She is no longer on B12.      Mood is good usually and is on 50 sertraline.      She has a history of lumbar low back pain with disk disease and in the past was treated for blood pressure and cholersterol now.        10-point ROS o/w negative.      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   Family history is notable for carcinoid tumor in mother and breast cancer in an aunt and a cousin.   She is  and has one daughter and also a son from her current marriage. She stopped smoking in 1982 but never smoked very much. She drinks about 3 glasses of wine a day and does not abuse other drugs.   -      On exam she appeared comfortable with normal affect.    GENERAL Appears well. Accompanied by .  BP (!) 164/98  Pulse 87  Temp 98  F (36.7  C)  Resp 18  Ht 1.676 m (5' 5.98\")  Wt 65.8 kg (145 lb)  LMP 05/08/1997  SpO2 98%  BMI 23.41 kg/m2  HEENT No icterus.  NECK no mass.  CHEST Clear chest.   LYMPH No lymphadenopathy.  CV RRR w no sig murmur  ABD No HSMT.   EXT No edema.  NEURO Normal Romberg, heel/toe.  LOCAL SITE Local sites OK on legs.  PSYCH Mood pleasant   SKIN OK      -  CBC OK except MCV still 103; B12 was 1859 in the past.   She is not on B12 now.    -  Her original PET-CT did not show metastatic disease.   I reviewed her new cxr and I see no new lesions, and that is the formal read.      -  She is 4 years out from the EWS resection (May 2014).    We will follow the MCV for now; the last B12 was before October 2016.  The MCV increased toward the end of chemotherapy and has been up since then. Encouraged to follow-up with " PCP.     She continues on as needed tramadol.  Zantac will continue    She will check w her PCP on BP control.  She will see her GI again about endoscopy and colonoscopy soon.   She will follow w PCP on thyroid nodules.    We will see her about 10-18 w cxr and labs (CBC, CMP re MCV). She is following w her PCP to check cholesterol and BP.       Aldo Russ M.D.  Professor  Hematology, Oncology and Transplantation  -  cc:   Bon Mansfield MD, Orthopedics

## 2018-09-17 ENCOUNTER — TRANSFERRED RECORDS (OUTPATIENT)
Dept: HEALTH INFORMATION MANAGEMENT | Facility: CLINIC | Age: 63
End: 2018-09-17

## 2018-10-17 ENCOUNTER — RADIANT APPOINTMENT (OUTPATIENT)
Dept: GENERAL RADIOLOGY | Facility: CLINIC | Age: 63
End: 2018-10-17
Attending: INTERNAL MEDICINE
Payer: COMMERCIAL

## 2018-10-17 DIAGNOSIS — F43.22 ADJUSTMENT DISORDER WITH ANXIOUS MOOD: ICD-10-CM

## 2018-10-17 DIAGNOSIS — E04.1 THYROID NODULE: ICD-10-CM

## 2018-10-17 DIAGNOSIS — K21.9 GASTROESOPHAGEAL REFLUX DISEASE, ESOPHAGITIS PRESENCE NOT SPECIFIED: ICD-10-CM

## 2018-10-17 DIAGNOSIS — C41.9 EWING'S SARCOMA OF BONE (H): ICD-10-CM

## 2018-10-17 DIAGNOSIS — D75.89 MACROCYTOSIS: ICD-10-CM

## 2018-10-18 ENCOUNTER — ONCOLOGY VISIT (OUTPATIENT)
Dept: ONCOLOGY | Facility: CLINIC | Age: 63
End: 2018-10-18
Attending: INTERNAL MEDICINE
Payer: COMMERCIAL

## 2018-10-18 VITALS
HEIGHT: 65 IN | WEIGHT: 150.5 LBS | SYSTOLIC BLOOD PRESSURE: 121 MMHG | RESPIRATION RATE: 16 BRPM | DIASTOLIC BLOOD PRESSURE: 87 MMHG | BODY MASS INDEX: 25.08 KG/M2 | OXYGEN SATURATION: 95 % | HEART RATE: 76 BPM | TEMPERATURE: 97.9 F

## 2018-10-18 DIAGNOSIS — F43.22 ADJUSTMENT DISORDER WITH ANXIOUS MOOD: ICD-10-CM

## 2018-10-18 DIAGNOSIS — K21.9 GASTROESOPHAGEAL REFLUX DISEASE, ESOPHAGITIS PRESENCE NOT SPECIFIED: ICD-10-CM

## 2018-10-18 DIAGNOSIS — E04.1 THYROID NODULE: ICD-10-CM

## 2018-10-18 DIAGNOSIS — C41.9 EWING'S SARCOMA OF BONE (H): ICD-10-CM

## 2018-10-18 DIAGNOSIS — D75.89 MACROCYTOSIS: ICD-10-CM

## 2018-10-18 PROCEDURE — 99214 OFFICE O/P EST MOD 30 MIN: CPT | Mod: ZP | Performed by: INTERNAL MEDICINE

## 2018-10-18 PROCEDURE — G0463 HOSPITAL OUTPT CLINIC VISIT: HCPCS | Mod: ZF

## 2018-10-18 ASSESSMENT — PAIN SCALES - GENERAL: PAINLEVEL: NO PAIN (0)

## 2018-10-18 NOTE — MR AVS SNAPSHOT
After Visit Summary   10/18/2018    Lilibeth Pena    MRN: [id number]           Patient Information     Date Of Birth          1955        Visit Information        Provider Department      10/18/2018 11:00 AM Aldo Russ MD [facility]        Today's Diagnoses     Adjustment disorder with anxious mood        Crowder's sarcoma of bone (H)        Thyroid nodule        Macrocytosis        Gastroesophageal reflux disease, esophagitis presence not specified           Follow-ups after your visit        Your next 10 appointments already scheduled     Feb 21, 2019 11:00 AM CST   (Arrive by 10:45 AM)   Return Visit with Aldo Russ MD   [facility] ([facility])    [address]  [address]  [facility] [place] [address] [phone]              Future tests that were ordered for you today     Open Future Orders        Priority Expected Expires Ordered    CBC with platelets differential Routine 2/20/2019 9/18/2019 10/18/2018    Comprehensive metabolic panel Routine 2/20/2019 9/18/2019 10/18/2018    XR Chest 2 Views Routine 2/20/2019 9/18/2019 10/18/2018            Who to contact     If you have questions or need follow up information about today's clinic visit or your schedule please contact [facility] directly at [phone].  Normal or non-critical lab and imaging results will be communicated to you by MyChart, letter or phone within 4 business days after the clinic has received the results. If you do not hear from us within 7 days, please contact the clinic through MyChart or phone. If you have a critical or abnormal lab result, we will notify you by phone as soon as possible.  Submit refill requests through [employer] or call your pharmacy and they will forward the refill request to us. Please allow 3 business days for your refill to be completed.          Additional Information About Your  "Visit        MyChart Information     [employer] gives you secure access to your electronic health record. If you see a primary care provider, you can also send messages to your care team and make appointments. If you have questions, please call your primary care clinic.  If you do not have a primary care provider, please call [phone] and they will assist you.        Care EveryWhere ID     This is your Care EveryWhere ID. This could be used by other organizations to access your [place] medical records  [id number]        Your Vitals Were     Pulse Temperature Respirations Height Last Period Pulse Oximetry    76 97.9  F (36.6  C) (Oral) 16 1.651 m (5' 5\") 05/08/1997 95%    BMI (Body Mass Index)                   25.04 kg/m2            Blood Pressure from Last 3 Encounters:   10/18/18 121/87   06/14/18 (!) 164/98   02/15/18 (!) 168/108    Weight from Last 3 Encounters:   10/18/18 68.3 kg (150 lb 8 oz)   06/14/18 65.8 kg (145 lb)   02/15/18 66.5 kg (146 lb 8 oz)               Primary Care Provider Fax #    Physician No Ref-Primary [phone]       No address on file        Equal Access to Services     JOSH JACOBO : Daryn umanzor Sodemarcus, waaxda luqadaha, qaybta kaalmada adeegyada, kenroy graham. So [facility] [phone].    ATENCIÓN: Si habla español, tiene a shook disposición servicios gratuitos de asistencia lingüística. Llame al [phone].    We comply with applicable federal civil rights laws and [place] laws. We do not discriminate on the basis of race, color, national origin, age, disability, sex, sexual orientation, or gender identity.            Thank you!     Thank you for choosing [facility]  for your care. Our goal is always to provide you with excellent care. Hearing back from our patients is one way we can continue to improve our services. Please take a few minutes to complete the written survey that you may receive in the mail after your visit " with us. Thank you!             Your Updated Medication List - Protect others around you: Learn how to safely use, store and throw away your medicines at www.disposemymeds.org.          This list is accurate as of 10/18/18 11:36 AM.  Always use your most recent med list.                   Brand Name Dispense Instructions for use Diagnosis    gabapentin 300 MG capsule    NEURONTIN    90 capsule    Take 3 tablets (900mg) before bed    Crowder's sarcoma of bone (H), Peripheral neuropathy       HYDROcodone-acetaminophen 5-325 MG per tablet    NORCO    40 tablet    Take 1-2 tablets by mouth every 6 hours as needed for moderate to severe pain    Leg pain       ranitidine 150 MG tablet    ZANTAC    60 tablet    Take 1 tablet (150 mg) by mouth daily    Esophageal reflux, Sarcoma, Ewings (H)       rOPINIRole HCl 2 MG Tb24 tablet    REQUIP     Take 0.5 mg by mouth nightly as needed        SERTRALINE HCL PO      Take 50 mg by mouth daily        traMADol 50 MG tablet    ULTRAM    60 tablet    Take 1-2 tablets ( mg) by mouth every 6 hours as needed for moderate pain    Crowder's sarcoma of bone (H)       TRAZODONE HCL PO      Take 50 mg by mouth At Bedtime        VITAMIN D (CHOLECALCIFEROL) PO      Take 5,000 Units by mouth daily

## 2018-10-18 NOTE — NURSING NOTE
"Oncology Rooming Note    October 18, 2018 11:02 AM   Lilibeth Pena is a 63 year old female who presents for:    Chief Complaint   Patient presents with     Oncology Clinic Visit     Sarcoma     Initial Vitals: /87  Pulse 76  Temp 97.9  F (36.6  C) (Oral)  Resp 16  Ht 1.651 m (5' 5\")  Wt 68.3 kg (150 lb 8 oz)  LMP 05/08/1997  SpO2 95%  BMI 25.04 kg/m2 Estimated body mass index is 25.04 kg/(m^2) as calculated from the following:    Height as of this encounter: 1.651 m (5' 5\").    Weight as of this encounter: 68.3 kg (150 lb 8 oz). Body surface area is 1.77 meters squared.  No Pain (0) Comment: Data Unavailable   Patient's last menstrual period was 05/08/1997.  Allergies reviewed: Yes  Medications reviewed: Yes    Medications: Medication refills not needed today.  Pharmacy name entered into [facility]:    [facility] - [address] [facility] [address], [place] - [address]    Clinical concerns: No New Concerns    5 minutes for nursing intake (face to face time)     NEIDA Torres      "

## 2018-10-18 NOTE — LETTER
10/18/2018       RE: Lilibeth Pena  [address] [facility] [address]     Dear Colleague,    Thank you for referring your patient, Lilibeth Pena, to the [facility]. Please see a copy of my visit note below.    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   -  Background  In brief, she noted some right knee pain felt secondary to DJD for a number of years. In about 05/2013, she began noticing a bit of a lump and tenderness a bit farther down the right shin. This was gradually increasing and getting worse over a 1-month time frame. She had some longstanding tingling between the right first two toes. 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  FISH was negative for FLI1/EWSR1 on the BMBX.  -  She started CAV about 10-30-13.  She had 5 cycles of CAV, and 5 cycles of IMV.  Surgery was 5-15-14 w negative margins and a microscopic focus of residual tumor.  She had a nodule removed from the left calf that turned out to be a scar.      Folate and B12 were normal here in 2014.  -  She has had a B12 test that was clearly low at 109 locally and was previously on B12 injections.     B12 in 8/15 was 112,  MMA was 0.36 (nl <0.40).  She thinks she was taking B12 before starting in 2013 a bit before starting chemo and wound up stopping during chemo.  Therefore its a bit complicated and she follows w her PCP.  -      Interval history      She is doing pretty well overall.     She is following her BP at home now and it may be creeping back up; she is following w her PCP on that. She is not taking a  BP med now.  She will check w her PCP on BP control.    She had a colonoscopy and had a lipoma removed and also a ugi endococpy and was found to have a hiatal hernia.  She continues to have some stomach issues with intermittent pain; She has GERD and had a dilation 15+  "years ago. She is taking zantac daily, and finds zofran helpful.  She has a long history of gi problems w occ diarrhea depending the food.    We discussed again blocks under the head of the bed.    She is pretty active and works as a .      She is no longer on B12.      Mood is good usually and is on a new drug the name of which she does not recall.      She has a history of lumbar low back pain with disk disease and is on medication for blood pressure and cholersterol now.        10-point ROS o/w negative.      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   Family history is notable for carcinoid tumor in mother and breast cancer in an aunt and a cousin.   She is  and has one daughter and also a son from her current marriage. She stopped smoking in 1982 but never smoked very much. She drinks about 3 glasses of wine a day and does not abuse other drugs.   -      On exam she appeared comfortable with normal affect.  GENERAL Appears well. Accompanied by .  /87  Pulse 76  Temp 97.9  F (36.6  C) (Oral)  Resp 16  Ht 1.651 m (5' 5\")  Wt 68.3 kg (150 lb 8 oz)  LMP 05/08/1997  SpO2 95%  BMI 25.04 kg/m2  HEENT No icterus.  NECK no mass.  CHEST Clear chest.   CV RRR w no sig murmur  ABD No HSMT.   LYMPH No lymphadenopathy.  EXT No edema.  NEURO Normal Romberg, heel/toe.  PSYCH Mood pleasant   SKIN OK  LOCAL SITE Local sites OK on legs.      -  CBC, LFT, GNE OK in June except MCV still 103; B12 was 1859 in the past.  She is not on B12 now.    -  Her original PET-CT did not show metastatic disease.   I reviewed her new cxr and I see no new lesions, and that is the formal read.      -  She was 4 years out from the EWS resection (May 2014).     We will follow the MCV for now; the last B12 was before October 2016.  The MCV increased toward the end of chemotherapy and has been " up since then. Encouraged to follow-up with PCP.     She continues on as needed tramadol.  Zantac will continue     She will check w her PCP on BP control.  She will see her GI again about endoscopy and colonoscopy soon.   She will follow w PCP on thyroid nodules.    We will see her about 2-21 w cxr and labs (CBC, CMP re MCV). She is following w her PCP to check cholesterol and BP.       Aldo Russ M.D.  Professor  Hematology, Oncology and Transplantation  -  cc:   Bon Mansfield MD, Orthopedics

## 2018-10-18 NOTE — PROGRESS NOTES
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   -  Background  In brief, she noted some right knee pain felt secondary to DJD for a number of years. In about 05/2013, she began noticing a bit of a lump and tenderness a bit farther down the right shin. This was gradually increasing and getting worse over a 1-month time frame. She had some longstanding tingling between the right first two toes. 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  FISH was negative for FLI1/EWSR1 on the BMBX.  -  She started CAV about 10-30-13.  She had 5 cycles of CAV, and 5 cycles of IMV.  Surgery was 5-15-14 w negative margins and a microscopic focus of residual tumor.  She had a nodule removed from the left calf that turned out to be a scar.      Folate and B12 were normal here in 2014.  -  She has had a B12 test that was clearly low at 109 locally and was previously on B12 injections.     B12 in 8/15 was 112,  MMA was 0.36 (nl <0.40).  She thinks she was taking B12 before starting in 2013 a bit before starting chemo and wound up stopping during chemo.  Therefore its a bit complicated and she follows w her PCP.  -      Interval history      She is doing pretty well overall.     She is following her BP at home now and it may be creeping back up; she is following w her PCP on that. She is not taking a  BP med now.  She will check w her PCP on BP control.    She had a colonoscopy and had a lipoma removed and also a ugi endococpy and was found to have a hiatal hernia.  She continues to have some stomach issues with intermittent pain; She has GERD and had a dilation 15+ years ago. She is taking zantac daily, and finds zofran helpful.  She has a long history of gi problems w occ diarrhea depending the food.    We discussed again blocks under the head of the bed.    She is pretty active and works as a .      She is no  "longer on B12.      Mood is good usually and is on a new drug the name of which she does not recall.      She has a history of lumbar low back pain with disk disease and is on medication for blood pressure and cholersterol now.        10-point ROS o/w negative.      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   Family history is notable for carcinoid tumor in mother and breast cancer in an aunt and a cousin.   She is  and has one daughter and also a son from her current marriage. She stopped smoking in 1982 but never smoked very much. She drinks about 3 glasses of wine a day and does not abuse other drugs.   -      On exam she appeared comfortable with normal affect.  GENERAL Appears well. Accompanied by .  /87  Pulse 76  Temp 97.9  F (36.6  C) (Oral)  Resp 16  Ht 1.651 m (5' 5\")  Wt 68.3 kg (150 lb 8 oz)  LMP 05/08/1997  SpO2 95%  BMI 25.04 kg/m2  HEENT No icterus.  NECK no mass.  CHEST Clear chest.   CV RRR w no sig murmur  ABD No HSMT.   LYMPH No lymphadenopathy.  EXT No edema.  NEURO Normal Romberg, heel/toe.  PSYCH Mood pleasant   SKIN OK  LOCAL SITE Local sites OK on legs.      -  CBC, LFT, GNE OK in June except MCV still 103; B12 was 1859 in the past.  She is not on B12 now.    -  Her original PET-CT did not show metastatic disease.   I reviewed her new cxr and I see no new lesions, and that is the formal read.      -  She was 4 years out from the EWS resection (May 2014).     We will follow the MCV for now; the last B12 was before October 2016.  The MCV increased toward the end of chemotherapy and has been up since then. Encouraged to follow-up with PCP.     She continues on as needed tramadol.  Zantac will continue     She will check w her PCP on BP control.  She will see her GI again about endoscopy and colonoscopy soon.   She will follow w PCP on thyroid " nodules.    We will see her about 2-21 w cxr and labs (CBC, CMP re MCV). She is following w her PCP to check cholesterol and BP.       Aldo Russ M.D.  Professor  Hematology, Oncology and Transplantation  -  cc:   Bon Mansfield MD, Orthopedics

## 2018-11-27 ENCOUNTER — PATIENT OUTREACH (OUTPATIENT)
Dept: CARE COORDINATION | Facility: CLINIC | Age: 63
End: 2018-11-27

## 2019-03-04 ENCOUNTER — ANCILLARY PROCEDURE (OUTPATIENT)
Dept: GENERAL RADIOLOGY | Facility: CLINIC | Age: 64
End: 2019-03-04
Attending: INTERNAL MEDICINE
Payer: COMMERCIAL

## 2019-03-04 DIAGNOSIS — K21.9 GASTROESOPHAGEAL REFLUX DISEASE, ESOPHAGITIS PRESENCE NOT SPECIFIED: ICD-10-CM

## 2019-03-04 DIAGNOSIS — F43.22 ADJUSTMENT DISORDER WITH ANXIOUS MOOD: ICD-10-CM

## 2019-03-04 DIAGNOSIS — D75.89 MACROCYTOSIS: ICD-10-CM

## 2019-03-04 DIAGNOSIS — E04.1 THYROID NODULE: ICD-10-CM

## 2019-03-04 DIAGNOSIS — C41.9 EWING'S SARCOMA OF BONE (H): ICD-10-CM

## 2019-03-04 LAB
ALBUMIN SERPL-MCNC: 3.7 G/DL (ref 3.4–5)
ALP SERPL-CCNC: 149 U/L (ref 40–150)
ALT SERPL W P-5'-P-CCNC: 31 U/L (ref 0–50)
ANION GAP SERPL CALCULATED.3IONS-SCNC: 7 MMOL/L (ref 3–14)
AST SERPL W P-5'-P-CCNC: 39 U/L (ref 0–45)
BASOPHILS # BLD AUTO: 0 10E9/L (ref 0–0.2)
BASOPHILS NFR BLD AUTO: 1.2 %
BILIRUB SERPL-MCNC: 0.4 MG/DL (ref 0.2–1.3)
BUN SERPL-MCNC: 13 MG/DL (ref 7–30)
CALCIUM SERPL-MCNC: 9 MG/DL (ref 8.5–10.1)
CHLORIDE SERPL-SCNC: 103 MMOL/L (ref 94–109)
CO2 SERPL-SCNC: 30 MMOL/L (ref 20–32)
CREAT SERPL-MCNC: 0.85 MG/DL (ref 0.52–1.04)
DIFFERENTIAL METHOD BLD: ABNORMAL
EOSINOPHIL # BLD AUTO: 0.1 10E9/L (ref 0–0.7)
EOSINOPHIL NFR BLD AUTO: 2 %
ERYTHROCYTE [DISTWIDTH] IN BLOOD BY AUTOMATED COUNT: 13.6 % (ref 10–15)
GFR SERPL CREATININE-BSD FRML MDRD: 73 ML/MIN/{1.73_M2}
GLUCOSE SERPL-MCNC: 134 MG/DL (ref 70–99)
HCT VFR BLD AUTO: 43.2 % (ref 35–47)
HGB BLD-MCNC: 14.1 G/DL (ref 11.7–15.7)
IMM GRANULOCYTES # BLD: 0 10E9/L (ref 0–0.4)
IMM GRANULOCYTES NFR BLD: 0.3 %
LYMPHOCYTES # BLD AUTO: 1 10E9/L (ref 0.8–5.3)
LYMPHOCYTES NFR BLD AUTO: 29.8 %
MCH RBC QN AUTO: 33.9 PG (ref 26.5–33)
MCHC RBC AUTO-ENTMCNC: 32.6 G/DL (ref 31.5–36.5)
MCV RBC AUTO: 104 FL (ref 78–100)
MONOCYTES # BLD AUTO: 0.2 10E9/L (ref 0–1.3)
MONOCYTES NFR BLD AUTO: 6.9 %
NEUTROPHILS # BLD AUTO: 2.1 10E9/L (ref 1.6–8.3)
NEUTROPHILS NFR BLD AUTO: 59.8 %
NRBC # BLD AUTO: 0 10*3/UL
NRBC BLD AUTO-RTO: 0 /100
PLATELET # BLD AUTO: 366 10E9/L (ref 150–450)
POTASSIUM SERPL-SCNC: 3.7 MMOL/L (ref 3.4–5.3)
PROT SERPL-MCNC: 7.2 G/DL (ref 6.8–8.8)
RBC # BLD AUTO: 4.16 10E12/L (ref 3.8–5.2)
SODIUM SERPL-SCNC: 140 MMOL/L (ref 133–144)
WBC # BLD AUTO: 3.5 10E9/L (ref 4–11)

## 2019-03-05 ENCOUNTER — ONCOLOGY VISIT (OUTPATIENT)
Dept: ONCOLOGY | Facility: CLINIC | Age: 64
End: 2019-03-05
Attending: INTERNAL MEDICINE
Payer: COMMERCIAL

## 2019-03-05 VITALS
SYSTOLIC BLOOD PRESSURE: 124 MMHG | DIASTOLIC BLOOD PRESSURE: 84 MMHG | HEART RATE: 70 BPM | BODY MASS INDEX: 25.09 KG/M2 | TEMPERATURE: 98.4 F | RESPIRATION RATE: 16 BRPM | OXYGEN SATURATION: 96 % | WEIGHT: 150.79 LBS

## 2019-03-05 DIAGNOSIS — D75.89 MACROCYTOSIS: ICD-10-CM

## 2019-03-05 DIAGNOSIS — C41.9 EWING'S SARCOMA OF BONE (H): Primary | ICD-10-CM

## 2019-03-05 DIAGNOSIS — E04.1 THYROID NODULE: ICD-10-CM

## 2019-03-05 DIAGNOSIS — F43.22 ADJUSTMENT DISORDER WITH ANXIOUS MOOD: ICD-10-CM

## 2019-03-05 DIAGNOSIS — K21.9 GASTROESOPHAGEAL REFLUX DISEASE, ESOPHAGITIS PRESENCE NOT SPECIFIED: ICD-10-CM

## 2019-03-05 PROCEDURE — 99214 OFFICE O/P EST MOD 30 MIN: CPT | Mod: ZP | Performed by: INTERNAL MEDICINE

## 2019-03-05 PROCEDURE — G0463 HOSPITAL OUTPT CLINIC VISIT: HCPCS | Mod: ZF

## 2019-03-05 ASSESSMENT — PAIN SCALES - GENERAL: PAINLEVEL: NO PAIN (0)

## 2019-03-05 NOTE — LETTER
RE: Lilibeth Pnea  [address] [place] [id number]     Dear Colleague,    Thank you for referring your patient, Lilibeth Pena, to the [facility]. Please see a copy of my visit note below.    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   -  Background  In brief, she noted some right knee pain felt secondary to DJD for a number of years. In about 05/2013, she began noticing a bit of a lump and tenderness a bit farther down the right shin. This was gradually increasing and getting worse over a 1-month time frame. She had some longstanding tingling between the right first two toes. 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  FISH was negative for FLI1/EWSR1 on the BMBX.  -  She started CAV about 10-30-13.  She had 5 cycles of CAV, and 5 cycles of IMV.  Surgery was 5-15-14 w negative margins and a microscopic focus of residual tumor.  She had a nodule removed from the left calf that turned out to be a scar.      Folate and B12 were normal here in 2014.  -  She has had a B12 test that was clearly low at 109 locally and was previously on B12 injections.     B12 in 8/15 was 112,  MMA was 0.36 (nl <0.40).  She thinks she was taking B12 before starting in 2013 a bit before starting chemo and wound up stopping during chemo.  Therefore its a bit complicated and she follows w her PCP.  -        Interval history    She is doing pretty well overall.      She is following her BP at home and that's doing well.     She had a colonoscopy and had a lipoma removed and also a ugi endococpy and was found to have a hiatal hernia.  She continues to have some stomach issues with intermittent pain; She has GERD and had a dilation 15+ years ago. She is taking zantac daily, and finds zofran helpful.  She has a long history of gi problems w occ diarrhea depending the food.     We discussed again blocks under the head of the bed.     She just retired from work as a .       She is no longer on B12.      Mood is good usually and is on a  drug the name of which she does not recall.      She saw GI and had endoscopy and colonoscopy a few months ago.     She has a history of lumbar low back pain with disk disease and is on medication for blood pressure and cholersterol now.    10-point ROS o/w negative.      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   Family history is notable for carcinoid tumor in mother and breast cancer in an aunt and a cousin.   She is  and has one daughter and also a son from her current marriage. She stopped smoking in 1982 but never smoked very much. She drinks about 3 glasses of wine a day and does not abuse other drugs.   -      On exam she appeared comfortable with normal affect.  GENERAL Appears well. Accompanied by .  /84   Pulse 70   Temp 98.4  F (36.9  C) (Oral)   Resp 16   Wt 68.4 kg (150 lb 12.7 oz)   LMP 05/08/1997   SpO2 96%   BMI 25.09 kg/m     HEENT No icterus.  NECK no mass.  CHEST Clear chest.   CV RRR w no sig murmur  ABD No HSMT.   EXT No edema.  NEURO Normal Romberg, heel/toe.  PSYCH Mood pleasant   LYMPH No lymphadenopathy.  SKIN OK  LOCAL SITE Local sites OK on legs.      -  CBC, LFT, GNE OK except MCV still 104; B12 was 1859 in the past.  She is not on B12 now.    -  Her original PET-CT did not show metastatic disease.   I reviewed her new cxr and I see no new lesions, and that is the formal read.      -  She w ill be 5 years out from the EWS resection in May (May 2014 resection).     We will follow the MCV for now; the last B12 was before October 2016.  The MCV increased toward the end of chemotherapy and has been up since then. Encouraged to follow-up with PCP.     She continues on as  needed tramadol.  Zantac will continue     She will check w her PCP on BP control.  She will follow w PCP on thyroid nodules.    We will see her about  6-19 w cxr and labs (CBC, CMP re MCV). She is following w her PCP to check cholesterol and BP.       Aldo Russ M.D.  Professor  Hematology, Oncology and Transplantation  -  cc:   Bon Mansfield MD, Orthopedics

## 2019-03-05 NOTE — PROGRESS NOTES
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   -  Background  In brief, she noted some right knee pain felt secondary to DJD for a number of years. In about 05/2013, she began noticing a bit of a lump and tenderness a bit farther down the right shin. This was gradually increasing and getting worse over a 1-month time frame. She had some longstanding tingling between the right first two toes. 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  FISH was negative for FLI1/EWSR1 on the BMBX.  -  She started CAV about 10-30-13.  She had 5 cycles of CAV, and 5 cycles of IMV.  Surgery was 5-15-14 w negative margins and a microscopic focus of residual tumor.  She had a nodule removed from the left calf that turned out to be a scar.      Folate and B12 were normal here in 2014.  -  She has had a B12 test that was clearly low at 109 locally and was previously on B12 injections.     B12 in 8/15 was 112,  MMA was 0.36 (nl <0.40).  She thinks she was taking B12 before starting in 2013 a bit before starting chemo and wound up stopping during chemo.  Therefore its a bit complicated and she follows w her PCP.  -        Interval history    She is doing pretty well overall.      She is following her BP at home and that's doing well.     She had a colonoscopy and had a lipoma removed and also a ugi endococpy and was found to have a hiatal hernia.  She continues to have some stomach issues with intermittent pain; She has GERD and had a dilation 15+ years ago. She is taking zantac daily, and finds zofran helpful.  She has a long history of gi problems w occ diarrhea depending the food.    We discussed again blocks under the head of the bed.     She just retired from work as a .       She is no longer on B12.      Mood is good usually and is on a drug the name of which she does not recall.      She saw GI and had  endoscopy and colonoscopy a few months ago.     She has a history of lumbar low back pain with disk disease and is on medication for blood pressure and cholersterol now.    10-point ROS o/w negative.      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   Family history is notable for carcinoid tumor in mother and breast cancer in an aunt and a cousin.   She is  and has one daughter and also a son from her current marriage. She stopped smoking in 1982 but never smoked very much. She drinks about 3 glasses of wine a day and does not abuse other drugs.   -      On exam she appeared comfortable with normal affect.  GENERAL Appears well. Accompanied by .  /84   Pulse 70   Temp 98.4  F (36.9  C) (Oral)   Resp 16   Wt 68.4 kg (150 lb 12.7 oz)   LMP 05/08/1997   SpO2 96%   BMI 25.09 kg/m    HEENT No icterus.  NECK no mass.  CHEST Clear chest.   CV RRR w no sig murmur  ABD No HSMT.   EXT No edema.  NEURO Normal Romberg, heel/toe.  PSYCH Mood pleasant   LYMPH No lymphadenopathy.  SKIN OK  LOCAL SITE Local sites OK on legs.      -  CBC, LFT, GNE OK except MCV still 104; B12 was 1859 in the past.  She is not on B12 now.    -  Her original PET-CT did not show metastatic disease.   I reviewed her new cxr and I see no new lesions, and that is the formal read.      -  She will be 5 years out from the EWS resection in May (May 2014 resection).     We will follow the MCV for now; the last B12 was before October 2016.  The MCV increased toward the end of chemotherapy and has been up since then. Encouraged to follow-up with PCP.     She continues on as needed tramadol.  Zantac will continue     She will check w her PCP on BP control.  She will follow w PCP on thyroid nodules.    We will see her about 6-19 w cxr and labs (CBC, CMP re MCV). She is following w her PCP to check cholesterol and  BP.       Aldo Russ M.D.  Professor  Hematology, Oncology and Transplantation  -  cc:   Bon Mansfield MD, Orthopedics

## 2019-06-19 ENCOUNTER — ANCILLARY PROCEDURE (OUTPATIENT)
Dept: GENERAL RADIOLOGY | Facility: CLINIC | Age: 64
End: 2019-06-19
Attending: INTERNAL MEDICINE
Payer: COMMERCIAL

## 2019-06-19 DIAGNOSIS — K21.9 GASTROESOPHAGEAL REFLUX DISEASE, ESOPHAGITIS PRESENCE NOT SPECIFIED: ICD-10-CM

## 2019-06-19 DIAGNOSIS — D75.89 MACROCYTOSIS: ICD-10-CM

## 2019-06-19 DIAGNOSIS — F43.22 ADJUSTMENT DISORDER WITH ANXIOUS MOOD: ICD-10-CM

## 2019-06-19 DIAGNOSIS — E04.1 THYROID NODULE: ICD-10-CM

## 2019-06-19 DIAGNOSIS — C41.9 EWING'S SARCOMA OF BONE (H): ICD-10-CM

## 2019-06-19 LAB
ALBUMIN SERPL-MCNC: 3.7 G/DL (ref 3.4–5)
ALP SERPL-CCNC: 136 U/L (ref 40–150)
ALT SERPL W P-5'-P-CCNC: 33 U/L (ref 0–50)
ANION GAP SERPL CALCULATED.3IONS-SCNC: 7 MMOL/L (ref 3–14)
AST SERPL W P-5'-P-CCNC: 30 U/L (ref 0–45)
BASOPHILS # BLD AUTO: 0 10E9/L (ref 0–0.2)
BASOPHILS NFR BLD AUTO: 0.5 %
BILIRUB SERPL-MCNC: 0.5 MG/DL (ref 0.2–1.3)
BUN SERPL-MCNC: 13 MG/DL (ref 7–30)
CALCIUM SERPL-MCNC: 9.1 MG/DL (ref 8.5–10.1)
CHLORIDE SERPL-SCNC: 105 MMOL/L (ref 94–109)
CO2 SERPL-SCNC: 26 MMOL/L (ref 20–32)
CREAT SERPL-MCNC: 0.87 MG/DL (ref 0.52–1.04)
DIFFERENTIAL METHOD BLD: ABNORMAL
EOSINOPHIL # BLD AUTO: 0.1 10E9/L (ref 0–0.7)
EOSINOPHIL NFR BLD AUTO: 0.9 %
ERYTHROCYTE [DISTWIDTH] IN BLOOD BY AUTOMATED COUNT: 12.8 % (ref 10–15)
GFR SERPL CREATININE-BSD FRML MDRD: 71 ML/MIN/{1.73_M2}
GLUCOSE SERPL-MCNC: 121 MG/DL (ref 70–99)
HCT VFR BLD AUTO: 40.2 % (ref 35–47)
HGB BLD-MCNC: 13.4 G/DL (ref 11.7–15.7)
IMM GRANULOCYTES # BLD: 0 10E9/L (ref 0–0.4)
IMM GRANULOCYTES NFR BLD: 0.4 %
LYMPHOCYTES # BLD AUTO: 1 10E9/L (ref 0.8–5.3)
LYMPHOCYTES NFR BLD AUTO: 17.7 %
MCH RBC QN AUTO: 34.9 PG (ref 26.5–33)
MCHC RBC AUTO-ENTMCNC: 33.3 G/DL (ref 31.5–36.5)
MCV RBC AUTO: 105 FL (ref 78–100)
MONOCYTES # BLD AUTO: 0.4 10E9/L (ref 0–1.3)
MONOCYTES NFR BLD AUTO: 7.4 %
NEUTROPHILS # BLD AUTO: 4.1 10E9/L (ref 1.6–8.3)
NEUTROPHILS NFR BLD AUTO: 73.1 %
NRBC # BLD AUTO: 0 10*3/UL
NRBC BLD AUTO-RTO: 0 /100
PLATELET # BLD AUTO: 325 10E9/L (ref 150–450)
POTASSIUM SERPL-SCNC: 3.7 MMOL/L (ref 3.4–5.3)
PROT SERPL-MCNC: 6.8 G/DL (ref 6.8–8.8)
RBC # BLD AUTO: 3.84 10E12/L (ref 3.8–5.2)
SODIUM SERPL-SCNC: 138 MMOL/L (ref 133–144)
WBC # BLD AUTO: 5.7 10E9/L (ref 4–11)

## 2019-06-20 ENCOUNTER — ONCOLOGY VISIT (OUTPATIENT)
Dept: ONCOLOGY | Facility: CLINIC | Age: 64
End: 2019-06-20
Attending: INTERNAL MEDICINE
Payer: COMMERCIAL

## 2019-06-20 VITALS
SYSTOLIC BLOOD PRESSURE: 143 MMHG | WEIGHT: 145.1 LBS | TEMPERATURE: 97 F | OXYGEN SATURATION: 97 % | BODY MASS INDEX: 24.18 KG/M2 | RESPIRATION RATE: 14 BRPM | HEART RATE: 81 BPM | HEIGHT: 65 IN | DIASTOLIC BLOOD PRESSURE: 99 MMHG

## 2019-06-20 DIAGNOSIS — E04.1 THYROID NODULE: ICD-10-CM

## 2019-06-20 DIAGNOSIS — F43.22 ADJUSTMENT DISORDER WITH ANXIOUS MOOD: ICD-10-CM

## 2019-06-20 DIAGNOSIS — D75.89 MACROCYTOSIS: ICD-10-CM

## 2019-06-20 DIAGNOSIS — C41.9 EWING'S SARCOMA OF BONE (H): Primary | ICD-10-CM

## 2019-06-20 DIAGNOSIS — K21.9 GASTROESOPHAGEAL REFLUX DISEASE, ESOPHAGITIS PRESENCE NOT SPECIFIED: ICD-10-CM

## 2019-06-20 PROCEDURE — G0463 HOSPITAL OUTPT CLINIC VISIT: HCPCS | Mod: ZF

## 2019-06-20 PROCEDURE — 99214 OFFICE O/P EST MOD 30 MIN: CPT | Mod: ZP | Performed by: INTERNAL MEDICINE

## 2019-06-20 RX ORDER — SUMATRIPTAN 25 MG/1
25 TABLET, FILM COATED ORAL
COMMUNITY
Start: 2019-06-10

## 2019-06-20 RX ORDER — AMLODIPINE BESYLATE 5 MG/1
TABLET ORAL
COMMUNITY
Start: 2019-06-11

## 2019-06-20 RX ORDER — ONDANSETRON 8 MG/1
TABLET, ORALLY DISINTEGRATING ORAL
COMMUNITY
Start: 2019-06-10

## 2019-06-20 RX ORDER — DULOXETIN HYDROCHLORIDE 30 MG/1
CAPSULE, DELAYED RELEASE ORAL
COMMUNITY
Start: 2019-06-11

## 2019-06-20 RX ORDER — TAMSULOSIN HYDROCHLORIDE 0.4 MG/1
0.4 CAPSULE ORAL
COMMUNITY
Start: 2019-06-12 | End: 2019-07-03

## 2019-06-20 ASSESSMENT — MIFFLIN-ST. JEOR: SCORE: 1209.05

## 2019-06-20 ASSESSMENT — PAIN SCALES - GENERAL: PAINLEVEL: NO PAIN (0)

## 2019-06-20 NOTE — PROGRESS NOTES
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   -  Background  In brief, she noted some right knee pain felt secondary to DJD for a number of years. In about 05/2013, she began noticing a bit of a lump and tenderness a bit farther down the right shin. This was gradually increasing and getting worse over a 1-month time frame. She had some longstanding tingling between the right first two toes. 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  FISH was negative for FLI1/EWSR1 on the BMBX.  -  She started CAV about 10-30-13.  She had 5 cycles of CAV, and 5 cycles of IMV.  Surgery was 5-15-14 w negative margins and a microscopic focus of residual tumor.  She had a nodule removed from the left calf that turned out to be a scar.      Folate and B12 were normal here in 2014.  -  She has had a B12 test that was clearly low at 109 locally and was previously on B12 injections.     B12 in 8/15 was 112,  MMA was 0.36 (nl <0.40).  She thinks she was taking B12 before starting in 2013 a bit before starting chemo and wound up stopping during chemo.  Therefore its a bit complicated and she follows w her PCP.  -         Interval history     She is doing pretty well overall.     She is following her BP at home and is on a BP med and sees her PCP tomorrow on that.     She had a colonoscopy and had a lipoma removed and also a ugi endococpy and was found to have a hiatal hernia.    She continues to have some stomach issues with intermittent pain; She has baseline GERD and had a dilation 15+ years ago. She is taking zantac daily, and finds zofran helpful.  She has a long history of gi problems w occ diarrhea depending the food.      We discussed again blocks under the head of the bed.     She is retired.     She is no longer on B12.      Mood is good usually and is on cymbalta; she thinks retiring helped the most.    She has  "renal lithiasis and is following w her nephrologist on that.      She saw GI and had endoscopy and colonoscopy in 2018 and will likely see them for a dilation this year.      She has a history of lumbar low back pain with disk disease and is on medication for blood pressure and cholersterol now.      10-point ROS o/w negative.      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   Family history is notable for carcinoid tumor in mother and breast cancer in an aunt and a cousin.   She is  and has one daughter and also a son from her current marriage. She stopped smoking in 1982 but never smoked very much. She drinks about 3 glasses of wine a day and does not abuse other drugs.   -      On exam she appeared comfortable with normal affect.  GENERAL Appears well. Accompanied by .  BP (!) 143/99 (BP Location: Right arm, Patient Position: Chair, Cuff Size: Adult Regular)   Pulse 81   Temp 97  F (36.1  C) (Oral)   Resp 14   Ht 1.651 m (5' 5\")   Wt 65.8 kg (145 lb 1.6 oz)   LMP 05/08/1997   SpO2 97%   BMI 24.15 kg/m    HEENT No icterus.  NECK no mass.  CHEST Clear chest.   CV RRR w no sig murmur  ABD No HSMT.  LYMPH No lymphadenopathy.   EXT No edema.  NEURO Normal Romberg, heel/toe.  PSYCH Mood pleasant   SKIN OK  LOCAL SITE Local sites OK on legs.      -  CBC, LFT, GNE OK except MCV still 104; B12 was 1859 in the past. Folate was normal. She is not on B12 now.      -  Her original PET-CT did not show metastatic disease.   I reviewed her new cxr and I see no new lesions, and that is the formal read.      -  She is 5 years out from the EWS resection (May 2014 resection).    We will follow the MCV for now; the last B12 was before October 2016.  The MCV increased toward the end of chemotherapy and has been up since then. She will f/u w PCP.     She continues on as needed tramadol about " 3/week at hs.  Zantac will continue     She will check w her PCP on BP control.  She will follow w PCP on thyroid nodules.    We will see her about 6-16 w cxr and labs (CBC, CMP re MCV). She is following w her PCP to check cholesterol and BP.     Aldo Russ M.D.  Professor  Hematology, Oncology and Transplantation  -  cc:   Bon Mansfield MD, Orthopedics

## 2019-06-20 NOTE — LETTER
6/20/2019       RE: Lilibeth Pena  [address] [facility] [place] [id number]     Dear Colleague,    Thank you for referring your patient, Lilibeth Pena, to the [facility]. Please see a copy of my visit note below.    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   -  Background  In brief, she noted some right knee pain felt secondary to DJD for a number of years. In about 05/2013, she began noticing a bit of a lump and tenderness a bit farther down the right shin. This was gradually increasing and getting worse over a 1-month time frame. She had some longstanding tingling between the right first two toes. 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  FISH was negative for FLI1/EWSR1 on the BMBX.  -  She started CAV about 10-30-13.  She had 5 cycles of CAV, and 5 cycles of IMV.  Surgery was 5-15-14 w negative margins and a microscopic focus of residual tumor.  She had a nodule removed from the left calf that turned out to be a scar.      Folate and B12 were normal here in 2014.  -  She has had a B12 test that was clearly low at 109 locally and was previously on B12 injections.     B12 in 8/15 was 112,  MMA was 0.36 (nl <0.40).  She thinks she was taking B12 before starting in 2013 a bit before starting chemo and wound up stopping during chemo.  Therefore its a bit complicated and she follows w her PCP.  -         Interval history     She is doing pretty well overall.     She is following her BP at home and  is on a BP med and sees her PCP tomorrow on that.     She had a colonoscopy and had a lipoma removed and also a ugi endococpy and was found to have a hiatal hernia.    She continues to have some stomach issues with intermittent pain; She has baseline GERD and had a dilation 15+ years ago. She is taking zantac daily, and finds zofran helpful.  She has a  "long history of gi problems w occ diarrhea depending the food.      We discussed again blocks under the head of the bed.     She  is retired.     She is no longer on B12.      Mood is good usually and is on cymbalta; she thinks retiring helped the most.    She has renal lithiasis and is following w her nephrologist on that.      She saw GI and had endoscopy and colonoscopy in 2018 and will likely see them for a dilation this year.      She has a history of lumbar low back pain with disk disease and is on medication for blood pressure and cholersterol now.      10-point ROS o/w negative.      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   Family history is notable for carcinoid tumor in mother and breast cancer in an aunt and a cousin.   She is  and has one daughter and also a son from her current marriage. She stopped smoking in 1982 but never smoked very much. She drinks about 3 glasses of wine a day and does not abuse other drugs.   -      On exam she appeared comfortable with normal affect.  GENERAL Appears well. Accompanied by .  BP (!) 143/99 (BP Location: Right arm, Patient Position: Chair, Cuff Size: Adult Regular)   Pulse 81   Temp 97  F (36.1  C) (Oral)   Resp 14   Ht 1.651 m (5' 5\")   Wt 65.8 kg (145 lb 1.6 oz)   LMP 05/08/1997   SpO2 97%   BMI 24.15 kg/m     HEENT No icterus.  NECK no mass.  CHEST Clear chest.   CV RRR w no sig murmur  ABD No HSMT.  LYMPH No lymphadenopathy.   EXT No edema.  NEURO Normal Romberg, heel/toe.  PSYCH Mood pleasant   SKIN OK  LOCAL SITE Local sites OK on legs.      -  CBC, LFT, GNE OK except MCV still 104; B12 was 1859 in the past. Folate was normal. She is not on B12 now.      -  Her original PET-CT did not show metastatic disease.   I reviewed her new cxr and I see no new lesions, and that is the formal read.      -  She is 5 years " out from the EWS resection (May 2014 resection).    We will follow the MCV for now; the last B12 was before October 2016.  The MCV increased toward the end of chemotherapy and has been up since then. She will f/u w PCP.     She continues on as needed tramadol about 3/week at hs.  Zantac will continue     She will check w her PCP on BP control.  She will follow w PCP on thyroid nodules.    We will see her about 6-16 w cxr and labs (CBC, CMP re MCV). She is following w her PCP to check cholesterol and BP.     Aldo Russ M.D.  Professor  Hematology, Oncology and Transplantation  -  cc:   Bon Mansfield MD, Orthopedics

## 2019-06-20 NOTE — NURSING NOTE
"Oncology Rooming Note    June 20, 2019 10:53 AM   Lilibeth Pena is a 64 year old female who presents for:    Chief Complaint   Patient presents with     Oncology Clinic Visit     [facility]- [facility]      Initial Vitals: BP (!) 143/99 (BP Location: Right arm, Patient Position: Chair, Cuff Size: Adult Regular)   Pulse 81   Temp 97  F (36.1  C) (Oral)   Resp 14   Ht 1.651 m (5' 5\")   Wt 65.8 kg (145 lb 1.6 oz)   LMP 05/08/1997   SpO2 97%   BMI 24.15 kg/m   Estimated body mass index is 24.15 kg/m  as calculated from the following:    Height as of this encounter: 1.651 m (5' 5\").    Weight as of this encounter: 65.8 kg (145 lb 1.6 oz). Body surface area is 1.74 meters squared.  No Pain (0) Comment: Data Unavailable   Patient's last menstrual period was 05/08/1997.  Allergies reviewed: Yes  Medications reviewed: Yes    Medications: Medication refills not needed today.  Pharmacy name entered into [facility]:    [facility] [address] [facility] [address]    Clinical concerns: No new concerns. [place] was notified.      David Albert LPN            "

## 2020-07-17 ENCOUNTER — PATIENT OUTREACH (OUTPATIENT)
Dept: ONCOLOGY | Facility: CLINIC | Age: 65
End: 2020-07-17

## 2020-07-17 NOTE — PROGRESS NOTES
Spoke with Lilibeth who wants to know if her visit with  will be virtual next week and if so how that will work.  Explained that it will be a video visit and someone will be calling her about 15-20 minutes prior to the appointment and sending a link to join.  Let her know that if she doesn't have the appropriate technology, the visit will be converted to a telephone visit.  She asked if she could have her labs done locally then so she didn't have to drive 3.5 hours to get them done here.  Let her know yes, that can be done.  She states that the local clinic is the [facility] in [place] and  is her PCP.  Let her know she should get her labs done on Monday so we can get them and  can look at them.  She agreed with and verbalized understanding of the plan of care.  Called to get a fax number, but the clinic was closed.  Will try again in Monday.

## 2020-07-20 ENCOUNTER — TELEPHONE (OUTPATIENT)
Dept: ONCOLOGY | Facility: CLINIC | Age: 65
End: 2020-07-20

## 2020-07-20 NOTE — TELEPHONE ENCOUNTER
Lab orders faxed per message below to [phone].    Evelia Don CMA ([facility])      ----- Message from Julisa Bellamy RN sent at 7/20/2020  3:33 PM CDT -----  Regarding: lab orders faxed  Hello,    Can you please fax her lab orders to the [facility] in [place] at [phone].    Thanks,  Julisa

## 2021-01-28 ENCOUNTER — PATIENT OUTREACH (OUTPATIENT)
Dept: ONCOLOGY | Facility: CLINIC | Age: 66
End: 2021-01-28
Payer: MEDICARE

## 2021-02-04 DIAGNOSIS — C41.9 EWING'S SARCOMA OF BONE (H): Primary | ICD-10-CM

## 2021-02-04 DIAGNOSIS — F43.22 ADJUSTMENT DISORDER WITH ANXIOUS MOOD: ICD-10-CM

## 2021-02-04 DIAGNOSIS — D75.89 MACROCYTOSIS: ICD-10-CM

## 2021-02-04 DIAGNOSIS — E04.1 THYROID NODULE: ICD-10-CM

## 2021-03-01 ENCOUNTER — TELEPHONE (OUTPATIENT)
Dept: ONCOLOGY | Facility: CLINIC | Age: 66
End: 2021-03-01

## 2021-03-01 DIAGNOSIS — C41.9 EWING'S SARCOMA OF BONE (H): Primary | ICD-10-CM

## 2021-03-01 NOTE — TELEPHONE ENCOUNTER
Requested lab orders faxed, per RNCC request to:   /Nikki at [phone]    Evelia Don CMA ([facility])

## 2021-03-23 ENCOUNTER — PATIENT OUTREACH (OUTPATIENT)
Dept: ONCOLOGY | Facility: CLINIC | Age: 66
End: 2021-03-23

## 2021-03-23 NOTE — PROGRESS NOTES
Called and [facility] for Shirley to let her know that orders for a cxr and labs (cbc,cmp) have been faxed with status confirmed from right fax to [phone] attn: /Nikki.  Asked for a callback with questions.

## 2021-03-24 ENCOUNTER — TRANSFERRED RECORDS (OUTPATIENT)
Dept: HEALTH INFORMATION MANAGEMENT | Facility: CLINIC | Age: 66
End: 2021-03-24

## 2021-03-24 LAB
ALBUMIN SERPL-MCNC: 4.4 G/DL (ref 3.5–5)
ALBUMIN/GLOB SERPL: 1.4 {RATIO} (ref 1–2.5)
ALP SERPL-CCNC: 163 U/L (ref 42–128)
ALT SERPL-CCNC: 9 U/L (ref 5–30)
AST SERPL-CCNC: 27 U/L (ref 7–31)
BILIRUB SERPL-MCNC: 1.4 MG/DL (ref 0.2–1)
BUN SERPL-MCNC: 9 MG/DL (ref 7–18)
BUN/CREATININE RATIO: 10
CALCIUM SERPL-MCNC: 10.6 MG/DL (ref 8.5–10.5)
CHLORIDE SERPLBLD-SCNC: 102 MMOL/L (ref 98–107)
CO2 SERPL-SCNC: 29.8 MMOL/L (ref 23–29)
CREAT SERPL-MCNC: 0.9 MG/DL (ref 0.6–1.2)
GFR SERPL CREATININE-BSD FRML MDRD: 63 ML/MIN/1.73M2 (ref 60–200)
GLOBULIN, CALCULATED - QUEST: 3.1 (ref 1.5–3.5)
GLUCOSE SERPL-MCNC: 137 MG/DL (ref 70–105)
POTASSIUM SERPL-SCNC: 3.9 MMOL/L (ref 3.5–5.1)
PROT SERPL-MCNC: 7.5 G/DL (ref 6.4–8.3)
SODIUM SERPL-SCNC: 141 MMOL/L (ref 136–145)

## 2021-03-25 ENCOUNTER — PATIENT OUTREACH (OUTPATIENT)
Dept: ONCOLOGY | Facility: CLINIC | Age: 66
End: 2021-03-25

## 2021-03-25 NOTE — PROGRESS NOTES
Called and  [facility] [place] for Lilibeth to let her know that her bilirubin was a little elevated at 1.4 and her alk phos was also elevated at 163.  Asked her for a call back to schedule a follow-up with .

## 2021-04-18 ENCOUNTER — HEALTH MAINTENANCE LETTER (OUTPATIENT)
Age: 66
End: 2021-04-18

## 2021-05-12 ENCOUNTER — VIRTUAL VISIT (OUTPATIENT)
Dept: ONCOLOGY | Facility: CLINIC | Age: 66
End: 2021-05-12
Attending: INTERNAL MEDICINE
Payer: MEDICARE

## 2021-05-12 DIAGNOSIS — C41.9 EWING'S SARCOMA OF BONE (H): Primary | ICD-10-CM

## 2021-05-12 DIAGNOSIS — F43.22 ADJUSTMENT DISORDER WITH ANXIOUS MOOD: ICD-10-CM

## 2021-05-12 DIAGNOSIS — E04.1 THYROID NODULE: ICD-10-CM

## 2021-05-12 DIAGNOSIS — K21.9 GASTROESOPHAGEAL REFLUX DISEASE, UNSPECIFIED WHETHER ESOPHAGITIS PRESENT: ICD-10-CM

## 2021-05-12 PROCEDURE — 999N001193 HC VIDEO/TELEPHONE VISIT; NO CHARGE

## 2021-05-12 PROCEDURE — 99213 OFFICE O/P EST LOW 20 MIN: CPT | Mod: 95 | Performed by: INTERNAL MEDICINE

## 2021-05-12 NOTE — LETTER
5/12/2021         RE: Lilibeth Pena  [address] [place] [id number]        Dear Colleague,    Thank you for referring your patient, Lilibeth Pena, to the [facility]. Please see a copy of my visit note below.    Lilibeth is a 66 year old who is being evaluated via a billable video visit.      How would you like to obtain your AVS? MyChart  If the video visit is dropped, the invitation should be resent by: Text to cell phone: [phone]  Will anyone else be joining your video visit? No      Video Start Time: 3:04 pm  Video-Visit Details    Type of service:  Video Visit    Video End Time:3:18 PM    Originating Location (pt. 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   -  Background  In brief, she noted some right knee pain felt secondary to DJD for a number of years. In about 05/2013, she began noticing a bit of a lump and tenderness a bit farther down the right shin. This was gradually increasing and getting worse over a 1-month time frame. She had some longstanding tingling between the right first two toes. 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  FISH was negative for FLI1/EWSR1 on the BMBX.  -  She started CAV about 10-30-13.  She had 5 cycles of CAV, and 5 cycles of IMV.  Surgery was 5-15-14 w negative margins and a microscopic focus of residual tumor.  She had a nodule removed from the left calf that turned out to be a scar.      Folate and B12 were normal here in 2014.  -  She has had a B12 test that was clearly low at 109 locally and was previously on B12 injections.     B12 in 8/15 was 112,  MMA was 0.36 (nl <0.40).  She thinks she was taking B12 before starting in 2013 a bit  "before starting chemo and wound up stopping during chemo.  Therefore its a bit complicated and she follows w her PCP.  -      Interval history     She has been having ongoing stomach issues. This has bothered her per previous notes, but she feels it's been worse lately. She has intermittent abdominal pains with sudden nausea and vomiting, as well as episodes of diarrhea depending on diet, worse with dairy products. She gained about 8-10 lbs of \"water weight\" since December. A couple weeks ago she underwent an EGD and colonoscopy with several colonic polyps removed per patient. She felt better after this with no recent episodes of vomiting and less diarrhea.     She also reports worsening bilateral knee pain, R>L. It is affecting her mobility and she is looking into joint replacement.     Of note, EGD on 9/24/2018 showed hiatal hernia.     She restarted B12 due to low levels.       Mood [place] been good [place]     Workup of thyroid nodules was benign.     10-point ROS o/w negative.      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   Family history is notable for carcinoid tumor in mother and breast cancer in an aunt and a cousin.   She is  and has one daughter and also a son from her current marriage. She stopped smoking in 1982 but never smoked very much. She drinks about 3 glasses of wine a day and does not abuse other drugs.   -      Gen: On video examination she appears comfortable in NAD  HEENT full EOMs  NECK no obvious goiter or mass  Resp: breathing comfortably in NAD  NEURO normal mentation and speech  PSYCH fairly good mood      -  Labs notable for Bilirubin 1.4, alk phos 163      -  Her original PET-CT did not show metastatic disease.   I reviewed her new cxr from 3/2021 and I see no new lesions, and that is the formal read.      -  She is 7 years " out from the EWS resection (May 2014 resection).  Follow up in 1 year with CXR and labs    GI issues: continue ongoing evaluation per her PCP and GI    Knee pain: considering joint replacement options    B12: resume supplementation per PCP    Mood: continue cymbalta    The patient was seen and assessed with staff, Dr. Russ.    Gatito Ramos MD  PGY-4 Radiation Oncology    I independently examined this patient and my assessment is in agreement with the above note.  I reviewed all tests and past medical history and my evaluation agrees with the findings in the note.     Aldo Russ M.D.  Professor  Hematology, Oncology and Transplantation             Again, thank you for allowing me to participate in the care of your patient.        Sincerely,        Aldo Russ MD

## 2021-05-12 NOTE — PROGRESS NOTES
Lilibeth is a 66 year old who is being evaluated via a billable video visit.      How would you like to obtain your AVS? [employer]  If the video visit is dropped, the invitation should be resent by: Text to cell phone: [phone]  Will anyone else be joining your video visit? No      Video Start Time: 3:04 pm  Video-Visit Details    Type of service:  Video Visit    Video End Time:3:18 PM    Originating Location (pt. 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   -  Background  In brief, she noted some right knee pain felt secondary to DJD for a number of years. In about 05/2013, she began noticing a bit of a lump and tenderness a bit farther down the right shin. This was gradually increasing and getting worse over a 1-month time frame. She had some longstanding tingling between the right first two toes. 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  FISH was negative for FLI1/EWSR1 on the BMBX.  -  She started CAV about 10-30-13.  She had 5 cycles of CAV, and 5 cycles of IMV.  Surgery was 5-15-14 w negative margins and a microscopic focus of residual tumor.  She had a nodule removed from the left calf that turned out to be a scar.      Folate and B12 were normal here in 2014.  -  She has had a B12 test that was clearly low at 109 locally and was previously on B12 injections.     B12 in 8/15 was 112,  MMA was 0.36 (nl <0.40).  She thinks she was taking B12 before starting in 2013 a bit before starting chemo and wound up stopping during chemo.  Therefore its a bit complicated and she follows w her PCP.  -      Interval history     She has been having ongoing stomach issues. This has bothered her per previous notes, but she feels it's been worse lately. She has  "intermittent abdominal pains with sudden nausea and vomiting, as well as episodes of diarrhea depending on diet, worse with dairy products. She gained about 8-10 lbs of \"water weight\" since December. A couple weeks ago she underwent an EGD and colonoscopy with several colonic polyps removed per patient. She felt better after this with no recent episodes of vomiting and less diarrhea.     She also reports worsening bilateral knee pain, R>L. It is affecting her mobility and she is looking into joint replacement.     Of note, EGD on 9/24/2018 showed hiatal hernia.     She restarted B12 due to low levels.       Mood has been good lately [place]     Workup of thyroid nodules was benign.     10-point ROS o/w negative.      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   Family history is notable for carcinoid tumor in mother and breast cancer in an aunt and a cousin.   She is  and has one daughter and also a son from her current marriage. She stopped smoking in 1982 but never smoked very much. She drinks about 3 glasses of wine a day and does not abuse other drugs.   -      Gen: On video examination she appears comfortable in NAD  HEENT full EOMs  NECK no obvious goiter or mass  Resp: breathing comfortably in NAD  NEURO normal mentation and speech  PSYCH fairly good mood      -  Labs notable for Bilirubin 1.4, alk phos 163      -  Her original PET-CT did not show metastatic disease.   I reviewed her new cxr from 3/2021 and I see no new lesions, and that is the formal read.      -  She is 7 years out from the EWS resection (May 2014 resection).  Follow up in 1 year with CXR and labs    GI issues: continue ongoing evaluation per her PCP and GI    Knee pain: considering joint replacement options    B12: resume supplementation per PCP    Mood: continue cymbalta    The patient " was seen and assessed with staff, Dr. Russ.    Gatito Ramos MD  PGY-4 Radiation Oncology    I independently examined this patient and my assessment is in agreement with the above note.  I reviewed all tests and past medical history and my evaluation agrees with the findings in the note.     Aldo Russ M.D.  Professor  Hematology, Oncology and Transplantation

## 2021-10-03 ENCOUNTER — HEALTH MAINTENANCE LETTER (OUTPATIENT)
Age: 66
End: 2021-10-03

## 2022-05-15 ENCOUNTER — HEALTH MAINTENANCE LETTER (OUTPATIENT)
Age: 67
End: 2022-05-15

## 2022-07-13 ENCOUNTER — TRANSFERRED RECORDS (OUTPATIENT)
Dept: HEALTH INFORMATION MANAGEMENT | Facility: CLINIC | Age: 67
End: 2022-07-13

## 2022-09-10 ENCOUNTER — HEALTH MAINTENANCE LETTER (OUTPATIENT)
Age: 67
End: 2022-09-10

## 2023-01-21 ENCOUNTER — HEALTH MAINTENANCE LETTER (OUTPATIENT)
Age: 68
End: 2023-01-21

## 2023-06-03 ENCOUNTER — HEALTH MAINTENANCE LETTER (OUTPATIENT)
Age: 68
End: 2023-06-03